# Patient Record
Sex: FEMALE | Race: WHITE | NOT HISPANIC OR LATINO | Employment: FULL TIME | URBAN - METROPOLITAN AREA
[De-identification: names, ages, dates, MRNs, and addresses within clinical notes are randomized per-mention and may not be internally consistent; named-entity substitution may affect disease eponyms.]

---

## 2017-09-22 ENCOUNTER — HOSPITAL ENCOUNTER (OUTPATIENT)
Dept: GENERAL RADIOLOGY | Facility: HOSPITAL | Age: 27
Discharge: HOME | End: 2017-09-22
Admitting: INTERNAL MEDICINE

## 2017-09-22 ENCOUNTER — TRANSCRIBE ORDERS (OUTPATIENT)
Dept: ADMINISTRATIVE | Facility: HOSPITAL | Age: 27
End: 2017-09-22

## 2017-09-22 DIAGNOSIS — M79.672 LEFT FOOT PAIN: ICD-10-CM

## 2017-09-22 DIAGNOSIS — M25.572 LEFT ANKLE PAIN, UNSPECIFIED CHRONICITY: ICD-10-CM

## 2017-09-22 DIAGNOSIS — M79.672 LEFT FOOT PAIN: Primary | ICD-10-CM

## 2017-09-22 PROCEDURE — 73630 X-RAY EXAM OF FOOT: CPT

## 2017-09-22 PROCEDURE — 73610 X-RAY EXAM OF ANKLE: CPT

## 2019-04-11 LAB
EXTERNAL CHLAMYDIA SCREEN: NORMAL
EXTERNAL GONORRHEA SCREEN: NORMAL
EXTERNAL HEPATITIS B SURFACE ANTIGEN: NEGATIVE
EXTERNAL HEPATITIS C AB: NORMAL
EXTERNAL RUBELLA QUALITATIVE: NORMAL
EXTERNAL SYPHILIS RPR SCREEN: NORMAL
EXTERNAL URINE DRUG SCREEN: NORMAL
HIV1 P24 AG SERPL QL IA: NORMAL

## 2019-08-22 ENCOUNTER — HOSPITAL ENCOUNTER (OUTPATIENT)
Facility: HOSPITAL | Age: 29
Setting detail: OBSERVATION
Discharge: HOME | End: 2019-08-23
Attending: OBSTETRICS & GYNECOLOGY | Admitting: OBSTETRICS & GYNECOLOGY

## 2019-08-22 PROBLEM — O16.9 ELEVATED BLOOD PRESSURE AFFECTING PREGNANCY, ANTEPARTUM: Status: ACTIVE | Noted: 2019-08-22

## 2019-08-22 LAB
ALP SERPL-CCNC: 55 U/L (ref 39–117)
ALT SERPL W P-5'-P-CCNC: 12 U/L (ref 1–33)
AST SERPL-CCNC: 11 U/L (ref 1–32)
BASOPHILS # BLD AUTO: 0.02 10*3/MM3 (ref 0–0.2)
BASOPHILS NFR BLD AUTO: 0.3 % (ref 0–1.5)
BILIRUB SERPL-MCNC: <0.2 MG/DL (ref 0.2–1.2)
CREAT BLD-MCNC: 0.61 MG/DL (ref 0.57–1)
DEPRECATED RDW RBC AUTO: 45.2 FL (ref 37–54)
EOSINOPHIL # BLD AUTO: 0.02 10*3/MM3 (ref 0–0.4)
EOSINOPHIL NFR BLD AUTO: 0.3 % (ref 0.3–6.2)
ERYTHROCYTE [DISTWIDTH] IN BLOOD BY AUTOMATED COUNT: 13.6 % (ref 12.3–15.4)
GLUCOSE BLDC GLUCOMTR-MCNC: 101 MG/DL (ref 70–130)
GLUCOSE BLDC GLUCOMTR-MCNC: 79 MG/DL (ref 70–130)
GLUCOSE BLDC GLUCOMTR-MCNC: 98 MG/DL (ref 70–130)
HCT VFR BLD AUTO: 32.4 % (ref 34–46.6)
HGB BLD-MCNC: 10.7 G/DL (ref 12–15.9)
IMM GRANULOCYTES # BLD AUTO: 0.08 10*3/MM3 (ref 0–0.05)
IMM GRANULOCYTES NFR BLD AUTO: 1.1 % (ref 0–0.5)
LDH SERPL-CCNC: 180 U/L (ref 135–214)
LYMPHOCYTES # BLD AUTO: 1.24 10*3/MM3 (ref 0.7–3.1)
LYMPHOCYTES NFR BLD AUTO: 16.9 % (ref 19.6–45.3)
MCH RBC QN AUTO: 30.5 PG (ref 26.6–33)
MCHC RBC AUTO-ENTMCNC: 33 G/DL (ref 31.5–35.7)
MCV RBC AUTO: 92.3 FL (ref 79–97)
MONOCYTES # BLD AUTO: 0.49 10*3/MM3 (ref 0.1–0.9)
MONOCYTES NFR BLD AUTO: 6.7 % (ref 5–12)
NEUTROPHILS # BLD AUTO: 5.47 10*3/MM3 (ref 1.7–7)
NEUTROPHILS NFR BLD AUTO: 74.7 % (ref 42.7–76)
NRBC BLD AUTO-RTO: 0 /100 WBC (ref 0–0.2)
PLATELET # BLD AUTO: 141 10*3/MM3 (ref 140–450)
PMV BLD AUTO: 11.4 FL (ref 6–12)
RBC # BLD AUTO: 3.51 10*6/MM3 (ref 3.77–5.28)
URATE SERPL-MCNC: 4.7 MG/DL (ref 2.4–5.7)
WBC NRBC COR # BLD: 7.32 10*3/MM3 (ref 3.4–10.8)

## 2019-08-22 PROCEDURE — 85025 COMPLETE CBC W/AUTO DIFF WBC: CPT | Performed by: OBSTETRICS & GYNECOLOGY

## 2019-08-22 PROCEDURE — 82962 GLUCOSE BLOOD TEST: CPT

## 2019-08-22 PROCEDURE — 82565 ASSAY OF CREATININE: CPT | Performed by: OBSTETRICS & GYNECOLOGY

## 2019-08-22 PROCEDURE — 96372 THER/PROPH/DIAG INJ SC/IM: CPT

## 2019-08-22 PROCEDURE — 25010000002 BETAMETHASONE ACET & SOD PHOS PER 4 MG: Performed by: OBSTETRICS & GYNECOLOGY

## 2019-08-22 PROCEDURE — 84450 TRANSFERASE (AST) (SGOT): CPT | Performed by: OBSTETRICS & GYNECOLOGY

## 2019-08-22 PROCEDURE — G0379 DIRECT REFER HOSPITAL OBSERV: HCPCS

## 2019-08-22 PROCEDURE — 84460 ALANINE AMINO (ALT) (SGPT): CPT | Performed by: OBSTETRICS & GYNECOLOGY

## 2019-08-22 PROCEDURE — 84550 ASSAY OF BLOOD/URIC ACID: CPT | Performed by: OBSTETRICS & GYNECOLOGY

## 2019-08-22 PROCEDURE — G0378 HOSPITAL OBSERVATION PER HR: HCPCS

## 2019-08-22 PROCEDURE — 82247 BILIRUBIN TOTAL: CPT | Performed by: OBSTETRICS & GYNECOLOGY

## 2019-08-22 PROCEDURE — 59025 FETAL NON-STRESS TEST: CPT

## 2019-08-22 PROCEDURE — 84075 ASSAY ALKALINE PHOSPHATASE: CPT | Performed by: OBSTETRICS & GYNECOLOGY

## 2019-08-22 PROCEDURE — 83615 LACTATE (LD) (LDH) ENZYME: CPT | Performed by: OBSTETRICS & GYNECOLOGY

## 2019-08-22 RX ORDER — BETAMETHASONE SODIUM PHOSPHATE AND BETAMETHASONE ACETATE 3; 3 MG/ML; MG/ML
12 INJECTION, SUSPENSION INTRA-ARTICULAR; INTRALESIONAL; INTRAMUSCULAR; SOFT TISSUE EVERY 24 HOURS
Status: COMPLETED | OUTPATIENT
Start: 2019-08-22 | End: 2019-08-23

## 2019-08-22 RX ORDER — PRENATAL WITH FERROUS FUM AND FOLIC ACID 3080; 920; 120; 400; 22; 1.84; 3; 20; 10; 1; 12; 200; 27; 25; 2 [IU]/1; [IU]/1; MG/1; [IU]/1; MG/1; MG/1; MG/1; MG/1; MG/1; MG/1; UG/1; MG/1; MG/1; MG/1; MG/1
1 TABLET ORAL DAILY
COMMUNITY

## 2019-08-22 RX ADMIN — BETAMETHASONE SODIUM PHOSPHATE AND BETAMETHASONE ACETATE 12 MG: 3; 3 INJECTION, SUSPENSION INTRA-ARTICULAR; INTRALESIONAL; INTRAMUSCULAR at 12:00

## 2019-08-22 NOTE — H&P
"Subjective     Chief Complaint   Patient presents with   • Elevated Blood Pressure       Kalyani Sandhu is a 29 y.o. year old  with an Estimated Date of Delivery: 19 currently at 29w1d presenting with elevated BP.    Prenatal care has been with Dr. renteria.  It has been significant for gestational hypertension.and gestational diabetes.      Kalyani has had normal BPs, until 24 weeks, then had mild range elevations 140s/90s. She had nl labs, no symptoms, was followed in office closely, 24hurine returned protein 330mg. She followed up in office today bp 160/100. So she was sent to l&d for overnight motnitoring, steroids, PDC eval    She was in office [id number] for 3hGTT, which returned abnl, hasnt yet gone to diabetic teaching.     No Additional Complaints Reported    The following portions of the patient's history were reviewed and updated as appropriate:vital signs, allergies, current medications, past medical history, past social history, past surgical history and problem list.    Review of Systems  Pertinent items are noted in HPI.     Objective     /74   Pulse 120   Ht 167.6 cm (66\")   Wt 95.3 kg (210 lb)   BMI 33.89 kg/m²     Physical Exam    General:  well developed; well nourished  no acute distress           Abdomen: soft, non-tender; no masses  no umbilical or inguinal hernias are present  no hepato-splenomegaly  fundus firm and non-tender           Assessment/Plan     ASSESSMENT/ plan  1. IUP at 29w1d  2. GHTN, - inpatient monitoring for severe disease, her 24h urine returned abnl 330 last week, will repeat today, repeat labs, steroids for FLM, PDC US in morning. She is asymptomatic at this point.   3. GDM- will start FSBS while she is in hosptial, and get nutrition consult for diabetic teaching.              Marianela Renteria MD  2019@  "

## 2019-08-22 NOTE — CONSULTS
"Adult Nutrition  Assessment/PES    Patient Name:  Kalyani Sandhu  YOB: 1990  MRN: [id number]  Admit Date:  8/22/2019    Assessment Date:  8/22/2019    Comments:  Anticipate good effort w diet.    Reason for Assessment     Row Name 08/22/19 1657          Reason for Assessment    Reason For Assessment  physician consult education 30 min     Diagnosis  other (see comments) Dx w gestational DM           Anthropometrics     Row Name 08/22/19 1058          Anthropometrics    Height  167.6 cm (66\")     Weight  95.3 kg (210 lb)                   Problem/Interventions:  Problem 1     Row Name 08/22/19 1658          Nutrition Diagnoses Problem 1    Problem 1  Knowledge Deficit     Etiology (related to)  Medical Diagnosis     Endocrine  GDM     Signs/Symptoms (evidenced by)  Reported  Information Deficit     Resolved?  Yes                 Intervention Goal     Row Name 08/22/19 1658          Intervention Goal    General  Provide information regarding MNT for treatment/condition             Education/Evaluation     Row Name 08/22/19 1650          Education    Education  Provided education regarding;Education topics;Advised regarding habits/behavior     Provided education regarding  Diet rationale;Key food habit change     Education Topics  CHO counting;Gestational DM     Advised Regarding Habits/Behavior  Eating pattern;Food choices;Appropriate portions        Monitor/Evaluation    Monitor  Per protocol     Education Follow-up  -- excellent attn to instruction and materials anticiptate good effort.           Electronically signed by:  Jyotsna Oseguera RD  08/22/19 5:01 PM  "

## 2019-08-23 ENCOUNTER — APPOINTMENT (OUTPATIENT)
Dept: WOMENS IMAGING | Facility: HOSPITAL | Age: 29
End: 2019-08-23

## 2019-08-23 VITALS
HEART RATE: 104 BPM | SYSTOLIC BLOOD PRESSURE: 136 MMHG | DIASTOLIC BLOOD PRESSURE: 72 MMHG | HEIGHT: 66 IN | TEMPERATURE: 97.9 F | WEIGHT: 210 LBS | BODY MASS INDEX: 33.75 KG/M2 | RESPIRATION RATE: 16 BRPM

## 2019-08-23 PROBLEM — O16.9 ELEVATED BLOOD PRESSURE COMPLICATING PREGNANCY, ANTEPARTUM: Status: ACTIVE | Noted: 2019-08-23

## 2019-08-23 LAB
COLLECT DURATION TIME UR: 24 HRS
GLUCOSE BLDC GLUCOMTR-MCNC: 104 MG/DL (ref 70–130)
GLUCOSE BLDC GLUCOMTR-MCNC: 121 MG/DL (ref 70–130)
GLUCOSE BLDC GLUCOMTR-MCNC: 90 MG/DL (ref 70–130)
GLUCOSE BLDC GLUCOMTR-MCNC: 95 MG/DL (ref 70–130)
PROT 24H UR-MRATE: 196 MG/24HOURS (ref 0–150)
PROT UR-MCNC: 5.6 MG/DL
SPECIMEN VOL 24H UR: 3500 ML

## 2019-08-23 PROCEDURE — 81050 URINALYSIS VOLUME MEASURE: CPT | Performed by: OBSTETRICS & GYNECOLOGY

## 2019-08-23 PROCEDURE — 25010000002 BETAMETHASONE ACET & SOD PHOS PER 4 MG: Performed by: OBSTETRICS & GYNECOLOGY

## 2019-08-23 PROCEDURE — 76811 OB US DETAILED SNGL FETUS: CPT

## 2019-08-23 PROCEDURE — 84156 ASSAY OF PROTEIN URINE: CPT | Performed by: OBSTETRICS & GYNECOLOGY

## 2019-08-23 PROCEDURE — G0108 DIAB MANAGE TRN  PER INDIV: HCPCS

## 2019-08-23 PROCEDURE — 59025 FETAL NON-STRESS TEST: CPT

## 2019-08-23 PROCEDURE — 82962 GLUCOSE BLOOD TEST: CPT

## 2019-08-23 PROCEDURE — G0378 HOSPITAL OBSERVATION PER HR: HCPCS

## 2019-08-23 PROCEDURE — 76811 OB US DETAILED SNGL FETUS: CPT | Performed by: OBSTETRICS & GYNECOLOGY

## 2019-08-23 PROCEDURE — 96372 THER/PROPH/DIAG INJ SC/IM: CPT

## 2019-08-23 RX ADMIN — BETAMETHASONE SODIUM PHOSPHATE AND BETAMETHASONE ACETATE 12 MG: 3; 3 INJECTION, SUSPENSION INTRA-ARTICULAR; INTRALESIONAL; INTRAMUSCULAR at 12:07

## 2019-08-23 NOTE — CONSULTS
"Diabetes Education  Assessment/Teaching    Patient Name:  Kalyani Sandhu  YOB: 1990  MRN: [id number]  Admit Date:  8/22/2019      Assessment Date:  8/23/2019    Most Recent Value   General Information    Referral From:  MD dhillon   Height  167.6 cm (66\")   Height Method  Stated   Weight  95.3 kg (210 lb)   Weight Method  Stated   How would you rate your current health?  good   Is patient pregnant?  yes   Pregnancy Assessment   When is your due date?  11/06/19   Do you plan to breast feed or bottle feed?  breast feed   Are you taking prenatal vitamins?  yes   Do you have indigestion or other food related problem?  no   Diabetes History   What type of diabetes do you have?  Gestational   Length of Diabetes Diagnosis  Newly diagnosed <6 months   Current DM knowledge  fair   Have you had diabetes education/teaching in the past?  no   Do you test your blood sugar at home?  no   Education Preferences   What areas of diabetes would you like to learn about?  avoiding high blood sugar, pregnancy and diabetes, understanding diabetes, testing my blood sugar at home   Nutrition Information   Are you currently following a special meal plan?  never   Assessment Topics   Healthy Eating - Assessment  Needs education   Being Active - Assessment  Needs education   Taking Medication - Assessment  Needs education   Problem Solving - Assessment  Needs education   Reducing Risk - Assessment  Needs education   Healthy Coping - Assessment  Needs education   Monitoring - Assessment  Needs education   DM Goals            Most Recent Value   DM Education Needs   Meter  Meter provided   Meter Type  One Touch   Frequency of Testing  4 times a day   Blood Glucose Target Range  per ADA recommendations   Problem Solving  Hypoglycemia, Hyperglycemia, Sick days, Signs, Symptoms, Treatment   Reducing Risks  Blood pressure, A1C testing   Healthy Eating  RD consult   Physical Activity  Walking   Physical Activity Frequency  " Occasionally, Discussed exercise importance   Healthy Coping  Appropriate   Gestational  Diagnosis, Risk factors, Treatment goals, Maternal complications, Fetal complications, Postpartum follow up, Type 2 DM risk reduction   Discharge Plan  Home, Follow-up with MD   Motivation  Engaged   Teaching Method  Explanation, Discussion, Demonstration, Handouts, Teach back   Patient Response  Verbalized understanding, Needs reinforcement, Demonstrates adequately            Other Comments:  Saw Kalyani at bedside for education on gestational diabetes. Her  was at bedside during education. Discussed and taught Kalyani and her  about gestational diabetes, gestational diabetes causes, and risk factors to mother and baby.  Education provided on the role of insulin resistance and hormone effects on blood sugar during pregnancy. Education also provided on higher risk of developing Type 2 diabetes. Discussed and gave tips for controlling blood sugar with healthy eating by following a suggested meal plan, regular activity/exercise if approved by MD, taking medications if ordered, and stress reduction. Provided Kalyani with a donated OneTouch Verio Flex meter. Taught her how to use meter and lancing device, and care of meter and strips. Kalyani has some anxiety regarding sticking her own finger at home. Suggested she start being more involved in her fingersticks here at the hospital-she was able to return demonstration with the provided meter, and was also able to demonstrate proper technique with obtaining drop of blood and placing it in strip when staff came to check her blood sugar. Instructed her to check blood sugar 4 times per day (fasting and 2 hours post prandial) once discharged home, and to keep a blood sugar log. Discussed w/ her that she will need to obtain prescription from her doctor for strips and lancets for the provided meter. Discussed the benefits of glucose control to reduce the risk of  complications. Target blood sugar goals per ADA recommendations for GDM given to patient, and instructed to follow her doctor's recommendations regarding both target goals and frequency of testing. Kalyani was provided with [facility]'s Gestational Diabetes book, which includes additional information and resources. Discussed outpatient education and Kalyani is very interested, would like to come to an outpatient class and meet with both the RD and RN. She requests an appointment for next Thursday b/c her  is off work and could attend with her. Scheduled appointment for Thursday 8/29 at 9am, notified patient; also made follow up phone call to pt's nurse Ana to notify that referral will be needed from Dr. Renteria for outpatient education. Kalyani was provided with my contact information for any questions or concerns. Thank you for the consult!    Electronically signed by:  Ange Giordano RN  08/23/19 2:16 PM

## 2019-08-23 NOTE — PROGRESS NOTES
Pt without headache or vision changes.    BP stable ; improved from admission.      24 hr urine; < 300 mg;     Discussed plan with Dr Renteria.     OK to discharge home to outpt management with Home BP monitoring and twice weekly fetal testing in office. Will keep off BP meds for now.

## 2019-08-23 NOTE — PROGRESS NOTES
2019  HD:1  29 y.o. yo female  at 29w2d    Subjective   Kalyani has no complaints. Denies headache, visual changes, RUQ pain. Good FM.    PDC US this am, nl growth, fluid.       Bps since admission have been nl-mild range. Labs nl. 24h urine pending       Objective   Temp: Temp:  [97.6 °F (36.4 °C)-99.2 °F (37.3 °C)] 97.6 °F (36.4 °C) Temp src: Oral   BP: BP: (121-143)/(73-87) 137/75        Pulse: Heart Rate:  [] 100  RR: Resp:  [15-16] 15    General:  nad   Abdomen: Gravid, nontender         Lab Results   Component Value Date    WBC 7.32 2019    HGB 10.7 (L) 2019    HCT 32.4 (L) 2019    MCV 92.3 2019     2019    HEPBSAG Negative 2019     Results from last 7 days   Lab Units 19  1130   AST (SGOT) U/L 11     Results from last 7 days   Lab Units 19  1130   ALT (SGPT) U/L 12      Results from last 7 days   Lab Units 19  1130   CREATININE mg/dL 0.61      Results from last 7 days   Lab Units 19  1130   BILIRUBIN mg/dL <0.2*         Assessment  1.   29 y.o. yo female  at 29w2d  2. GHTN- no evidence of severe disease  3. New diagnosis GDM- fsbs so far have been ok    Plan  D/w dr milligan. Will wait for 24h urine result this afternoon, as long as stable, ok for dc home with close office FU and decreased activity.    This note has been electronically signed.    Marianela Renteria MD  2019

## 2019-08-23 NOTE — PLAN OF CARE
Problem: Patient Care Overview  Goal: Plan of Care Review  Outcome: Ongoing (interventions implemented as appropriate)   08/23/19 1613   Coping/Psychosocial   Plan of Care Reviewed With patient   Plan of Care Review   Progress improving       Problem: Hypertensive Disorders in Pregnancy (Adult,Obstetrics,Pediatric)  Goal: Signs and Symptoms of Listed Potential Problems Will be Absent, Minimized or Managed (Hypertensive Disorders in Pregnancy)  Outcome: Ongoing (interventions implemented as appropriate)   08/23/19 1613   Goal/Outcome Evaluation   Problems Assessed (Hypertensive Disorders in Pregnancy) all   Problems Present (Hypertensive/in PG) none

## 2019-08-29 ENCOUNTER — HOSPITAL ENCOUNTER (OUTPATIENT)
Dept: DIABETES SERVICES | Facility: HOSPITAL | Age: 29
Setting detail: RECURRING SERIES
Discharge: HOME | End: 2019-08-29

## 2019-08-29 ENCOUNTER — TRANSCRIBE ORDERS (OUTPATIENT)
Dept: DIABETES SERVICES | Facility: HOSPITAL | Age: 29
End: 2019-08-29

## 2019-08-29 DIAGNOSIS — O24.410 DIET CONTROLLED GESTATIONAL DIABETES MELLITUS (GDM), ANTEPARTUM: Primary | ICD-10-CM

## 2019-08-29 PROCEDURE — G0109 DIAB MANAGE TRN IND/GROUP: HCPCS

## 2019-09-06 ENCOUNTER — APPOINTMENT (OUTPATIENT)
Dept: DIABETES SERVICES | Facility: HOSPITAL | Age: 29
End: 2019-09-06

## 2019-10-11 ENCOUNTER — LAB (OUTPATIENT)
Dept: LAB | Facility: HOSPITAL | Age: 29
End: 2019-10-11

## 2019-10-11 ENCOUNTER — TRANSCRIBE ORDERS (OUTPATIENT)
Dept: LAB | Facility: HOSPITAL | Age: 29
End: 2019-10-11

## 2019-10-11 DIAGNOSIS — Z34.03 ENCOUNTER FOR SUPERVISION OF NORMAL FIRST PREGNANCY IN THIRD TRIMESTER: ICD-10-CM

## 2019-10-11 DIAGNOSIS — Z34.03 ENCOUNTER FOR SUPERVISION OF NORMAL FIRST PREGNANCY IN THIRD TRIMESTER: Primary | ICD-10-CM

## 2019-10-11 PROCEDURE — 87081 CULTURE SCREEN ONLY: CPT

## 2019-10-14 LAB — BACTERIA SPEC AEROBE CULT: NORMAL

## 2019-10-21 ENCOUNTER — APPOINTMENT (OUTPATIENT)
Dept: PREADMISSION TESTING | Facility: HOSPITAL | Age: 29
End: 2019-10-21

## 2019-10-21 ENCOUNTER — PREP FOR SURGERY (OUTPATIENT)
Dept: OTHER | Facility: HOSPITAL | Age: 29
End: 2019-10-21

## 2019-10-21 VITALS — WEIGHT: 211.38 LBS | BODY MASS INDEX: 33.18 KG/M2 | HEIGHT: 67 IN

## 2019-10-21 DIAGNOSIS — O98.419 ACUTE VIRAL HEPATITIS AFFECTING PREGNANCY: ICD-10-CM

## 2019-10-21 DIAGNOSIS — Z34.90 PREGNANCY, UNSPECIFIED GESTATIONAL AGE: Primary | ICD-10-CM

## 2019-10-21 LAB
ABO GROUP BLD: NORMAL
ALP SERPL-CCNC: 97 U/L (ref 39–117)
ALT SERPL W P-5'-P-CCNC: 17 U/L (ref 1–33)
AST SERPL-CCNC: 16 U/L (ref 1–32)
BILIRUB SERPL-MCNC: 0.3 MG/DL (ref 0.2–1.2)
BLD GP AB SCN SERPL QL: NEGATIVE
CREAT BLD-MCNC: 0.71 MG/DL (ref 0.57–1)
DEPRECATED RDW RBC AUTO: 47.9 FL (ref 37–54)
ERYTHROCYTE [DISTWIDTH] IN BLOOD BY AUTOMATED COUNT: 14 % (ref 12.3–15.4)
HCT VFR BLD AUTO: 36 % (ref 34–46.6)
HGB BLD-MCNC: 12 G/DL (ref 12–15.9)
LDH SERPL-CCNC: 215 U/L (ref 135–214)
MCH RBC QN AUTO: 31.3 PG (ref 26.6–33)
MCHC RBC AUTO-ENTMCNC: 33.3 G/DL (ref 31.5–35.7)
MCV RBC AUTO: 93.8 FL (ref 79–97)
PLATELET # BLD AUTO: 125 10*3/MM3 (ref 140–450)
PMV BLD AUTO: 12 FL (ref 6–12)
RBC # BLD AUTO: 3.84 10*6/MM3 (ref 3.77–5.28)
RH BLD: POSITIVE
T&S EXPIRATION DATE: NORMAL
URATE SERPL-MCNC: 5.3 MG/DL (ref 2.4–5.7)
WBC NRBC COR # BLD: 6.55 10*3/MM3 (ref 3.4–10.8)

## 2019-10-21 PROCEDURE — 84450 TRANSFERASE (AST) (SGOT): CPT | Performed by: NURSE PRACTITIONER

## 2019-10-21 PROCEDURE — 86901 BLOOD TYPING SEROLOGIC RH(D): CPT | Performed by: NURSE PRACTITIONER

## 2019-10-21 PROCEDURE — 83615 LACTATE (LD) (LDH) ENZYME: CPT | Performed by: NURSE PRACTITIONER

## 2019-10-21 PROCEDURE — 84075 ASSAY ALKALINE PHOSPHATASE: CPT | Performed by: NURSE PRACTITIONER

## 2019-10-21 PROCEDURE — 84460 ALANINE AMINO (ALT) (SGPT): CPT | Performed by: NURSE PRACTITIONER

## 2019-10-21 PROCEDURE — 85027 COMPLETE CBC AUTOMATED: CPT | Performed by: NURSE PRACTITIONER

## 2019-10-21 PROCEDURE — 84550 ASSAY OF BLOOD/URIC ACID: CPT | Performed by: NURSE PRACTITIONER

## 2019-10-21 PROCEDURE — 82565 ASSAY OF CREATININE: CPT | Performed by: NURSE PRACTITIONER

## 2019-10-21 PROCEDURE — 86850 RBC ANTIBODY SCREEN: CPT | Performed by: NURSE PRACTITIONER

## 2019-10-21 PROCEDURE — 86900 BLOOD TYPING SEROLOGIC ABO: CPT | Performed by: NURSE PRACTITIONER

## 2019-10-21 PROCEDURE — 82247 BILIRUBIN TOTAL: CPT | Performed by: NURSE PRACTITIONER

## 2019-10-21 RX ORDER — SODIUM CHLORIDE 0.9 % (FLUSH) 0.9 %
10 SYRINGE (ML) INJECTION AS NEEDED
Status: CANCELLED | OUTPATIENT
Start: 2019-10-21

## 2019-10-21 RX ORDER — OXYTOCIN-SODIUM CHLORIDE 0.9% IV SOLN 30 UNIT/500ML 30-0.9/5 UT/ML-%
85 SOLUTION INTRAVENOUS ONCE
Status: CANCELLED | OUTPATIENT
Start: 2019-10-21 | End: 2019-10-21

## 2019-10-21 RX ORDER — OXYTOCIN-SODIUM CHLORIDE 0.9% IV SOLN 30 UNIT/500ML 30-0.9/5 UT/ML-%
650 SOLUTION INTRAVENOUS ONCE
Status: CANCELLED | OUTPATIENT
Start: 2019-10-21 | End: 2019-10-21

## 2019-10-21 RX ORDER — TRISODIUM CITRATE DIHYDRATE AND CITRIC ACID MONOHYDRATE 500; 334 MG/5ML; MG/5ML
30 SOLUTION ORAL ONCE
Status: CANCELLED | OUTPATIENT
Start: 2019-10-21 | End: 2019-10-21

## 2019-10-21 RX ORDER — SODIUM CHLORIDE 0.9 % (FLUSH) 0.9 %
3 SYRINGE (ML) INJECTION EVERY 12 HOURS SCHEDULED
Status: CANCELLED | OUTPATIENT
Start: 2019-10-21

## 2019-10-21 RX ORDER — CEFAZOLIN SODIUM 2 G/100ML
2 INJECTION, SOLUTION INTRAVENOUS ONCE
Status: CANCELLED | OUTPATIENT
Start: 2019-10-21 | End: 2019-10-21

## 2019-10-21 RX ORDER — LIDOCAINE HYDROCHLORIDE 10 MG/ML
5 INJECTION, SOLUTION EPIDURAL; INFILTRATION; INTRACAUDAL; PERINEURAL AS NEEDED
Status: CANCELLED | OUTPATIENT
Start: 2019-10-21

## 2019-10-21 RX ORDER — SODIUM CHLORIDE, SODIUM LACTATE, POTASSIUM CHLORIDE, CALCIUM CHLORIDE 600; 310; 30; 20 MG/100ML; MG/100ML; MG/100ML; MG/100ML
125 INJECTION, SOLUTION INTRAVENOUS CONTINUOUS
Status: CANCELLED | OUTPATIENT
Start: 2019-10-21

## 2019-10-21 NOTE — DISCHARGE INSTRUCTIONS
What to know before your arrive:     -Do not eat, drink or chew gum after midnight the day before your procedure.    This also includes mints.   -Do not shave any part of your body including abdomen or pelvic are for two    days before your procedure.   -If you are taking a scheduled medication (insulin, blood pressure medicine,   antibiotics) please consult with your physician whether to take on the day of   surgery.   -Remove all jewelry including rings, wedding bands, and piercing before coming   to the hospital.   -Leave important valuables at home.   -Do not wear dark fingernail polish.   -Bring the following with you to the hospital:    -Picture ID and insurance, Medicare or Medicaid cards    -Co-pay/deductible required by insurance (Cash, Check, Credit Card)    -Copy of living will or power  document (if applicable)    -CPAP mask and tubing, not machine (if applicable)    -Skin prep instructions sheet    What to know the day of procedure:     -Park in the [facility], take elevator for first floor, exit to the right and  proceed through the doors to outside, follow the covered sidewalk to the  entrance of the [facility], follow the hallway and signs to the [facility],  enter the [facility] to your right BEFORE entering the 17[address] lobby.  Take the  elevators to the 3rd floor ([address] [facility]).   -Leave unnecessary items in your vehicle, including your suitcase.  Your support  person or a family member can get it for you after your procedure.   -Check in at the reception desk in the lobby of the 3rd floor ([address] [facility]).   -One person may accompany you to the pre-op/recovery area.  Please have  other family members wait in the waiting room.   -An anesthesiologist will meet with your prior to your procedure.   -After anesthesia has been initiated, one person may accompany you in the  operating room.   -No video cameras are permitted in the operating room; only still cameras,  Please.      What to  expect while you are in recovery:     -One person may stay with you while you are in recovery.   -If the baby is stable, he/she may visit to initiate breastfeeding & [facility].    THE FOLLOWING INFORMATION WAS PROVIDED TO PATIENT:     SECTION BOOKLET BY PRIYANKA  PAIN MANAGEMENT   RESPIREX    CHLORHEXIDINE GLUCONATE WIPES AND INSTRUCTIONS GIVEN TO PATIENT

## 2019-10-22 ENCOUNTER — ANESTHESIA EVENT (OUTPATIENT)
Dept: LABOR AND DELIVERY | Facility: HOSPITAL | Age: 29
End: 2019-10-22

## 2019-10-22 ENCOUNTER — ANESTHESIA (OUTPATIENT)
Dept: LABOR AND DELIVERY | Facility: HOSPITAL | Age: 29
End: 2019-10-22

## 2019-10-22 ENCOUNTER — HOSPITAL ENCOUNTER (INPATIENT)
Facility: HOSPITAL | Age: 29
LOS: 4 days | Discharge: HOME | End: 2019-10-26
Attending: OBSTETRICS & GYNECOLOGY | Admitting: OBSTETRICS & GYNECOLOGY

## 2019-10-22 DIAGNOSIS — Z34.90 PREGNANCY, UNSPECIFIED GESTATIONAL AGE: ICD-10-CM

## 2019-10-22 PROBLEM — O13.9 GESTATIONAL HYPERTENSION: Status: ACTIVE | Noted: 2019-10-22

## 2019-10-22 LAB
ABO GROUP BLD: NORMAL
GLUCOSE BLDC GLUCOMTR-MCNC: 86 MG/DL (ref 70–130)
RH BLD: POSITIVE

## 2019-10-22 PROCEDURE — 25010000002 ONDANSETRON PER 1 MG: Performed by: NURSE ANESTHETIST, CERTIFIED REGISTERED

## 2019-10-22 PROCEDURE — 25010000002 FENTANYL CITRATE (PF) 100 MCG/2ML SOLUTION: Performed by: NURSE ANESTHETIST, CERTIFIED REGISTERED

## 2019-10-22 PROCEDURE — 86901 BLOOD TYPING SEROLOGIC RH(D): CPT

## 2019-10-22 PROCEDURE — 25010000003 CEFAZOLIN IN DEXTROSE 2-4 GM/100ML-% SOLUTION: Performed by: NURSE PRACTITIONER

## 2019-10-22 PROCEDURE — 63710000001 PROMETHAZINE PER 25 MG: Performed by: OBSTETRICS & GYNECOLOGY

## 2019-10-22 PROCEDURE — 82962 GLUCOSE BLOOD TEST: CPT

## 2019-10-22 PROCEDURE — 25010000002 METOCLOPRAMIDE PER 10 MG: Performed by: NURSE ANESTHETIST, CERTIFIED REGISTERED

## 2019-10-22 PROCEDURE — 59025 FETAL NON-STRESS TEST: CPT

## 2019-10-22 PROCEDURE — 25010000003 MORPHINE PER 10 MG: Performed by: NURSE ANESTHETIST, CERTIFIED REGISTERED

## 2019-10-22 PROCEDURE — 86900 BLOOD TYPING SEROLOGIC ABO: CPT

## 2019-10-22 PROCEDURE — 51703 INSERT BLADDER CATH COMPLEX: CPT

## 2019-10-22 RX ORDER — OXYTOCIN 10 [USP'U]/ML
INJECTION, SOLUTION INTRAMUSCULAR; INTRAVENOUS AS NEEDED
Status: DISCONTINUED | OUTPATIENT
Start: 2019-10-22 | End: 2019-10-22 | Stop reason: SURG

## 2019-10-22 RX ORDER — BUPIVACAINE HYDROCHLORIDE 7.5 MG/ML
INJECTION, SOLUTION EPIDURAL; RETROBULBAR AS NEEDED
Status: DISCONTINUED | OUTPATIENT
Start: 2019-10-22 | End: 2019-10-22 | Stop reason: SURG

## 2019-10-22 RX ORDER — SODIUM CHLORIDE 0.9 % (FLUSH) 0.9 %
10 SYRINGE (ML) INJECTION AS NEEDED
Status: DISCONTINUED | OUTPATIENT
Start: 2019-10-22 | End: 2019-10-22 | Stop reason: HOSPADM

## 2019-10-22 RX ORDER — CARBOPROST TROMETHAMINE 250 UG/ML
250 INJECTION, SOLUTION INTRAMUSCULAR ONCE
Status: DISCONTINUED | OUTPATIENT
Start: 2019-10-22 | End: 2019-10-26 | Stop reason: HOSPADM

## 2019-10-22 RX ORDER — SIMETHICONE 80 MG
80 TABLET,CHEWABLE ORAL 4 TIMES DAILY PRN
Status: DISCONTINUED | OUTPATIENT
Start: 2019-10-22 | End: 2019-10-26 | Stop reason: HOSPADM

## 2019-10-22 RX ORDER — CEFAZOLIN SODIUM 2 G/100ML
2 INJECTION, SOLUTION INTRAVENOUS ONCE
Status: COMPLETED | OUTPATIENT
Start: 2019-10-22 | End: 2019-10-22

## 2019-10-22 RX ORDER — MORPHINE SULFATE 2 MG/ML
2 INJECTION, SOLUTION INTRAMUSCULAR; INTRAVENOUS EVERY 4 HOURS PRN
Status: DISCONTINUED | OUTPATIENT
Start: 2019-10-22 | End: 2019-10-26 | Stop reason: HOSPADM

## 2019-10-22 RX ORDER — FENTANYL CITRATE 50 UG/ML
INJECTION, SOLUTION INTRAMUSCULAR; INTRAVENOUS AS NEEDED
Status: DISCONTINUED | OUTPATIENT
Start: 2019-10-22 | End: 2019-10-22 | Stop reason: SURG

## 2019-10-22 RX ORDER — OXYCODONE HYDROCHLORIDE AND ACETAMINOPHEN 5; 325 MG/1; MG/1
1 TABLET ORAL EVERY 4 HOURS PRN
Status: DISCONTINUED | OUTPATIENT
Start: 2019-10-22 | End: 2019-10-26 | Stop reason: HOSPADM

## 2019-10-22 RX ORDER — SODIUM CHLORIDE, SODIUM LACTATE, POTASSIUM CHLORIDE, CALCIUM CHLORIDE 600; 310; 30; 20 MG/100ML; MG/100ML; MG/100ML; MG/100ML
125 INJECTION, SOLUTION INTRAVENOUS CONTINUOUS
Status: DISCONTINUED | OUTPATIENT
Start: 2019-10-22 | End: 2019-10-22 | Stop reason: HOSPADM

## 2019-10-22 RX ORDER — ONDANSETRON 2 MG/ML
INJECTION INTRAMUSCULAR; INTRAVENOUS AS NEEDED
Status: DISCONTINUED | OUTPATIENT
Start: 2019-10-22 | End: 2019-10-22 | Stop reason: SURG

## 2019-10-22 RX ORDER — CALCIUM CARBONATE 200(500)MG
1 TABLET,CHEWABLE ORAL DAILY
COMMUNITY

## 2019-10-22 RX ORDER — FAMOTIDINE 10 MG/ML
INJECTION, SOLUTION INTRAVENOUS AS NEEDED
Status: DISCONTINUED | OUTPATIENT
Start: 2019-10-22 | End: 2019-10-22 | Stop reason: SURG

## 2019-10-22 RX ORDER — OXYTOCIN-SODIUM CHLORIDE 0.9% IV SOLN 30 UNIT/500ML 30-0.9/5 UT/ML-%
85 SOLUTION INTRAVENOUS ONCE
Status: COMPLETED | OUTPATIENT
Start: 2019-10-22 | End: 2019-10-22

## 2019-10-22 RX ORDER — NALOXONE HCL 0.4 MG/ML
0.4 VIAL (ML) INJECTION
Status: ACTIVE | OUTPATIENT
Start: 2019-10-22 | End: 2019-10-23

## 2019-10-22 RX ORDER — TRISODIUM CITRATE DIHYDRATE AND CITRIC ACID MONOHYDRATE 500; 334 MG/5ML; MG/5ML
30 SOLUTION ORAL ONCE
Status: COMPLETED | OUTPATIENT
Start: 2019-10-22 | End: 2019-10-22

## 2019-10-22 RX ORDER — METHYLERGONOVINE MALEATE 0.2 MG/ML
200 INJECTION INTRAVENOUS ONCE
Status: DISCONTINUED | OUTPATIENT
Start: 2019-10-22 | End: 2019-10-26 | Stop reason: HOSPADM

## 2019-10-22 RX ORDER — MORPHINE SULFATE 0.5 MG/ML
INJECTION, SOLUTION EPIDURAL; INTRATHECAL; INTRAVENOUS AS NEEDED
Status: DISCONTINUED | OUTPATIENT
Start: 2019-10-22 | End: 2019-10-22 | Stop reason: SURG

## 2019-10-22 RX ORDER — DOCUSATE SODIUM 100 MG/1
100 CAPSULE, LIQUID FILLED ORAL 2 TIMES DAILY PRN
Status: DISCONTINUED | OUTPATIENT
Start: 2019-10-22 | End: 2019-10-26 | Stop reason: HOSPADM

## 2019-10-22 RX ORDER — OXYCODONE AND ACETAMINOPHEN 7.5; 325 MG/1; MG/1
1 TABLET ORAL ONCE AS NEEDED
Status: COMPLETED | OUTPATIENT
Start: 2019-10-22 | End: 2019-10-22

## 2019-10-22 RX ORDER — PROMETHAZINE HYDROCHLORIDE 25 MG/ML
12.5 INJECTION, SOLUTION INTRAMUSCULAR; INTRAVENOUS EVERY 6 HOURS PRN
Status: DISCONTINUED | OUTPATIENT
Start: 2019-10-22 | End: 2019-10-26 | Stop reason: HOSPADM

## 2019-10-22 RX ORDER — SODIUM CHLORIDE 0.9 % (FLUSH) 0.9 %
3 SYRINGE (ML) INJECTION EVERY 12 HOURS SCHEDULED
Status: DISCONTINUED | OUTPATIENT
Start: 2019-10-22 | End: 2019-10-22 | Stop reason: HOSPADM

## 2019-10-22 RX ORDER — ONDANSETRON 2 MG/ML
4 INJECTION INTRAMUSCULAR; INTRAVENOUS ONCE
Status: DISCONTINUED | OUTPATIENT
Start: 2019-10-22 | End: 2019-10-22 | Stop reason: HOSPADM

## 2019-10-22 RX ORDER — PROMETHAZINE HYDROCHLORIDE 25 MG/1
25 TABLET ORAL EVERY 6 HOURS PRN
Status: DISCONTINUED | OUTPATIENT
Start: 2019-10-22 | End: 2019-10-26 | Stop reason: HOSPADM

## 2019-10-22 RX ORDER — DIPHENHYDRAMINE HCL 25 MG
25 CAPSULE ORAL EVERY 4 HOURS PRN
Status: DISCONTINUED | OUTPATIENT
Start: 2019-10-22 | End: 2019-10-26 | Stop reason: HOSPADM

## 2019-10-22 RX ORDER — IBUPROFEN 600 MG/1
600 TABLET ORAL EVERY 6 HOURS PRN
Status: DISCONTINUED | OUTPATIENT
Start: 2019-10-22 | End: 2019-10-26 | Stop reason: HOSPADM

## 2019-10-22 RX ORDER — DIPHENHYDRAMINE HYDROCHLORIDE 50 MG/ML
25 INJECTION INTRAMUSCULAR; INTRAVENOUS EVERY 4 HOURS PRN
Status: DISCONTINUED | OUTPATIENT
Start: 2019-10-22 | End: 2019-10-26 | Stop reason: HOSPADM

## 2019-10-22 RX ORDER — NALOXONE HCL 0.4 MG/ML
0.4 VIAL (ML) INJECTION
Status: DISCONTINUED | OUTPATIENT
Start: 2019-10-22 | End: 2019-10-26 | Stop reason: HOSPADM

## 2019-10-22 RX ORDER — OXYCODONE AND ACETAMINOPHEN 10; 325 MG/1; MG/1
1 TABLET ORAL EVERY 4 HOURS PRN
Status: DISCONTINUED | OUTPATIENT
Start: 2019-10-22 | End: 2019-10-26 | Stop reason: HOSPADM

## 2019-10-22 RX ORDER — OXYTOCIN-SODIUM CHLORIDE 0.9% IV SOLN 30 UNIT/500ML 30-0.9/5 UT/ML-%
650 SOLUTION INTRAVENOUS ONCE
Status: COMPLETED | OUTPATIENT
Start: 2019-10-22 | End: 2019-10-22

## 2019-10-22 RX ORDER — METOCLOPRAMIDE HYDROCHLORIDE 5 MG/ML
INJECTION INTRAMUSCULAR; INTRAVENOUS AS NEEDED
Status: DISCONTINUED | OUTPATIENT
Start: 2019-10-22 | End: 2019-10-22 | Stop reason: SURG

## 2019-10-22 RX ORDER — LIDOCAINE HYDROCHLORIDE 10 MG/ML
5 INJECTION, SOLUTION EPIDURAL; INFILTRATION; INTRACAUDAL; PERINEURAL AS NEEDED
Status: DISCONTINUED | OUTPATIENT
Start: 2019-10-22 | End: 2019-10-22 | Stop reason: HOSPADM

## 2019-10-22 RX ORDER — HYDROMORPHONE HYDROCHLORIDE 1 MG/ML
0.5 INJECTION, SOLUTION INTRAMUSCULAR; INTRAVENOUS; SUBCUTANEOUS
Status: DISCONTINUED | OUTPATIENT
Start: 2019-10-22 | End: 2019-10-22 | Stop reason: HOSPADM

## 2019-10-22 RX ORDER — MISOPROSTOL 200 UG/1
800 TABLET ORAL AS NEEDED
Status: DISCONTINUED | OUTPATIENT
Start: 2019-10-22 | End: 2019-10-26 | Stop reason: HOSPADM

## 2019-10-22 RX ORDER — PROMETHAZINE HYDROCHLORIDE 12.5 MG/1
12.5 SUPPOSITORY RECTAL EVERY 6 HOURS PRN
Status: DISCONTINUED | OUTPATIENT
Start: 2019-10-22 | End: 2019-10-26 | Stop reason: HOSPADM

## 2019-10-22 RX ADMIN — IBUPROFEN 600 MG: 600 TABLET ORAL at 17:49

## 2019-10-22 RX ADMIN — IBUPROFEN 600 MG: 600 TABLET ORAL at 12:42

## 2019-10-22 RX ADMIN — IBUPROFEN 600 MG: 600 TABLET ORAL at 23:49

## 2019-10-22 RX ADMIN — SODIUM CITRATE AND CITRIC ACID MONOHYDRATE 30 ML: 500; 334 SOLUTION ORAL at 09:12

## 2019-10-22 RX ADMIN — FAMOTIDINE 20 MG: 10 INJECTION, SOLUTION INTRAVENOUS at 09:17

## 2019-10-22 RX ADMIN — OXYTOCIN 30 UNITS: 10 INJECTION, SOLUTION INTRAMUSCULAR; INTRAVENOUS at 09:40

## 2019-10-22 RX ADMIN — DOCUSATE SODIUM 100 MG: 100 CAPSULE, LIQUID FILLED ORAL at 20:20

## 2019-10-22 RX ADMIN — FENTANYL CITRATE 15 MCG: 50 INJECTION, SOLUTION INTRAMUSCULAR; INTRAVENOUS at 09:23

## 2019-10-22 RX ADMIN — BUPIVACAINE HYDROCHLORIDE 1.6 ML: 7.5 INJECTION, SOLUTION EPIDURAL; RETROBULBAR at 09:23

## 2019-10-22 RX ADMIN — SIMETHICONE CHEW TAB 80 MG 80 MG: 80 TABLET ORAL at 12:41

## 2019-10-22 RX ADMIN — OXYTOCIN 650 ML/HR: 10 INJECTION, SOLUTION INTRAMUSCULAR; INTRAVENOUS at 10:26

## 2019-10-22 RX ADMIN — MORPHINE SULFATE 150 MCG: 0.5 INJECTION, SOLUTION EPIDURAL; INTRATHECAL; INTRAVENOUS at 09:23

## 2019-10-22 RX ADMIN — SODIUM CHLORIDE, POTASSIUM CHLORIDE, SODIUM LACTATE AND CALCIUM CHLORIDE 125 ML/HR: 600; 310; 30; 20 INJECTION, SOLUTION INTRAVENOUS at 08:35

## 2019-10-22 RX ADMIN — OXYTOCIN 85 ML/HR: 10 INJECTION, SOLUTION INTRAMUSCULAR; INTRAVENOUS at 11:00

## 2019-10-22 RX ADMIN — ONDANSETRON 4 MG: 2 INJECTION INTRAMUSCULAR; INTRAVENOUS at 09:17

## 2019-10-22 RX ADMIN — METOCLOPRAMIDE 10 MG: 5 INJECTION, SOLUTION INTRAMUSCULAR; INTRAVENOUS at 09:36

## 2019-10-22 RX ADMIN — OXYCODONE HYDROCHLORIDE AND ACETAMINOPHEN 1 TABLET: 10; 325 TABLET ORAL at 23:49

## 2019-10-22 RX ADMIN — PROMETHAZINE HYDROCHLORIDE 25 MG: 25 TABLET ORAL at 15:06

## 2019-10-22 RX ADMIN — SODIUM CHLORIDE, POTASSIUM CHLORIDE, SODIUM LACTATE AND CALCIUM CHLORIDE: 600; 310; 30; 20 INJECTION, SOLUTION INTRAVENOUS at 09:40

## 2019-10-22 RX ADMIN — OXYCODONE HYDROCHLORIDE AND ACETAMINOPHEN 1 TABLET: 7.5; 325 TABLET ORAL at 15:01

## 2019-10-22 RX ADMIN — OXYCODONE HYDROCHLORIDE AND ACETAMINOPHEN 1 TABLET: 10; 325 TABLET ORAL at 12:41

## 2019-10-22 RX ADMIN — SIMETHICONE CHEW TAB 80 MG 80 MG: 80 TABLET ORAL at 20:20

## 2019-10-22 RX ADMIN — CEFAZOLIN SODIUM 2 G: 2 INJECTION, SOLUTION INTRAVENOUS at 09:13

## 2019-10-22 RX ADMIN — OXYCODONE HYDROCHLORIDE AND ACETAMINOPHEN 1 TABLET: 10; 325 TABLET ORAL at 17:49

## 2019-10-22 RX ADMIN — SODIUM CHLORIDE, POTASSIUM CHLORIDE, SODIUM LACTATE AND CALCIUM CHLORIDE 125 ML/HR: 600; 310; 30; 20 INJECTION, SOLUTION INTRAVENOUS at 15:22

## 2019-10-22 RX ADMIN — SODIUM CHLORIDE, POTASSIUM CHLORIDE, SODIUM LACTATE AND CALCIUM CHLORIDE 125 ML/HR: 600; 310; 30; 20 INJECTION, SOLUTION INTRAVENOUS at 09:13

## 2019-10-22 NOTE — ANESTHESIA POSTPROCEDURE EVALUATION
Patient: Kalyani Sandhu    Procedure Summary     Date:  10/22/19 Room / Location:  [facility] LABOR DELIVERY   [facility]OR DELIVERY    Anesthesia Start:  916 Anesthesia Stop:      Procedure:   SECTION PRIMARY (N/A Abdomen) Diagnosis:      Surgeon:  Marianela Renteria MD Provider:  Eliane Randolph DO    Anesthesia Type:  spinal, ITN ASA Status:  3          Anesthesia Type: spinal, ITN  Last vitals  /62   Temp 98.1   Pulse 97   Resp 17   SpO2 98     Post Anesthesia Care and Evaluation    Patient location during evaluation: bedside  Patient participation: complete - patient participated  Level of consciousness: awake and alert  Pain management: adequate  Airway patency: patent  Anesthetic complications: No anesthetic complications    Cardiovascular status: acceptable  Respiratory status: acceptable  Hydration status: acceptable

## 2019-10-22 NOTE — ANESTHESIA PREPROCEDURE EVALUATION
Anesthesia Evaluation     Patient summary reviewed and Nursing notes reviewed   NPO Solid Status: > 8 hours  NPO Liquid Status: > 8 hours           Airway   Dental      Pulmonary - negative pulmonary ROS   Cardiovascular     (+) hypertension,       Neuro/Psych- negative ROS  GI/Hepatic/Renal/Endo    (+)   diabetes mellitus gestational,     Musculoskeletal (-) negative ROS    Abdominal    Substance History - negative use     OB/GYN    (+) Pregnant, pregnancy induced hypertension        Other - negative ROS                       Anesthesia Plan    ASA 3     spinal and ITN

## 2019-10-22 NOTE — ANESTHESIA PROCEDURE NOTES
Spinal Block      Patient reassessed immediately prior to procedure    Patient location during procedure: OR  Indication:at surgeon's request  Performed By  Anesthesiologist: Eliane Randolph DO  CRNA: Linh Smith CRNA  Preanesthetic Checklist  Completed: patient identified, surgical consent, pre-op evaluation, timeout performed, IV checked, risks and benefits discussed and monitors and equipment checked  Spinal Block Prep:  Patient Position:sitting  Sterile Tech:cap, gloves, mask and sterile barriers  Prep:Betadine  Patient Monitoring:blood pressure monitoring, continuous pulse oximetry and EKG  Spinal Block Procedure  Approach:midline  Guidance:palpation technique  Location:L3-L4  Needle Type:Chris  Needle Gauge:25 G  Placement of Spinal needle event:cerebrospinal fluid aspirated  Paresthesia: no  Fluid Appearance:clear     Post Assessment  Patient Tolerance:patient tolerated the procedure well with no apparent complications  Complications no

## 2019-10-23 LAB
BASOPHILS # BLD AUTO: 0.02 10*3/MM3 (ref 0–0.2)
BASOPHILS NFR BLD AUTO: 0.3 % (ref 0–1.5)
DEPRECATED RDW RBC AUTO: 48.7 FL (ref 37–54)
EOSINOPHIL # BLD AUTO: 0.07 10*3/MM3 (ref 0–0.4)
EOSINOPHIL NFR BLD AUTO: 1.2 % (ref 0.3–6.2)
ERYTHROCYTE [DISTWIDTH] IN BLOOD BY AUTOMATED COUNT: 14.3 % (ref 12.3–15.4)
HCT VFR BLD AUTO: 32.6 % (ref 34–46.6)
HGB BLD-MCNC: 10.5 G/DL (ref 12–15.9)
IMM GRANULOCYTES # BLD AUTO: 0.05 10*3/MM3 (ref 0–0.05)
IMM GRANULOCYTES NFR BLD AUTO: 0.8 % (ref 0–0.5)
LYMPHOCYTES # BLD AUTO: 1.31 10*3/MM3 (ref 0.7–3.1)
LYMPHOCYTES NFR BLD AUTO: 21.8 % (ref 19.6–45.3)
MCH RBC QN AUTO: 30.3 PG (ref 26.6–33)
MCHC RBC AUTO-ENTMCNC: 32.2 G/DL (ref 31.5–35.7)
MCV RBC AUTO: 93.9 FL (ref 79–97)
MONOCYTES # BLD AUTO: 0.41 10*3/MM3 (ref 0.1–0.9)
MONOCYTES NFR BLD AUTO: 6.8 % (ref 5–12)
NEUTROPHILS # BLD AUTO: 4.16 10*3/MM3 (ref 1.7–7)
NEUTROPHILS NFR BLD AUTO: 69.1 % (ref 42.7–76)
NRBC BLD AUTO-RTO: 0 /100 WBC (ref 0–0.2)
PLATELET # BLD AUTO: 115 10*3/MM3 (ref 140–450)
PMV BLD AUTO: 12.5 FL (ref 6–12)
RBC # BLD AUTO: 3.47 10*6/MM3 (ref 3.77–5.28)
WBC NRBC COR # BLD: 6.02 10*3/MM3 (ref 3.4–10.8)

## 2019-10-23 PROCEDURE — 85025 COMPLETE CBC W/AUTO DIFF WBC: CPT | Performed by: OBSTETRICS & GYNECOLOGY

## 2019-10-23 RX ADMIN — SIMETHICONE CHEW TAB 80 MG 80 MG: 80 TABLET ORAL at 09:02

## 2019-10-23 RX ADMIN — OXYCODONE HYDROCHLORIDE AND ACETAMINOPHEN 1 TABLET: 10; 325 TABLET ORAL at 12:07

## 2019-10-23 RX ADMIN — IBUPROFEN 600 MG: 600 TABLET ORAL at 06:05

## 2019-10-23 RX ADMIN — SIMETHICONE CHEW TAB 80 MG 80 MG: 80 TABLET ORAL at 21:21

## 2019-10-23 RX ADMIN — OXYCODONE HYDROCHLORIDE AND ACETAMINOPHEN 1 TABLET: 10; 325 TABLET ORAL at 06:05

## 2019-10-23 RX ADMIN — IBUPROFEN 600 MG: 600 TABLET ORAL at 12:07

## 2019-10-23 RX ADMIN — IBUPROFEN 600 MG: 600 TABLET ORAL at 18:03

## 2019-10-23 RX ADMIN — OXYCODONE HYDROCHLORIDE AND ACETAMINOPHEN 1 TABLET: 5; 325 TABLET ORAL at 18:03

## 2019-10-23 RX ADMIN — DOCUSATE SODIUM 100 MG: 100 CAPSULE, LIQUID FILLED ORAL at 21:21

## 2019-10-23 RX ADMIN — DOCUSATE SODIUM 100 MG: 100 CAPSULE, LIQUID FILLED ORAL at 09:02

## 2019-10-24 LAB
ALP SERPL-CCNC: 85 U/L (ref 39–117)
ALT SERPL W P-5'-P-CCNC: 16 U/L (ref 1–33)
AST SERPL-CCNC: 17 U/L (ref 1–32)
BASOPHILS # BLD AUTO: 0.01 10*3/MM3 (ref 0–0.2)
BASOPHILS NFR BLD AUTO: 0.1 % (ref 0–1.5)
BILIRUB SERPL-MCNC: 0.2 MG/DL (ref 0.2–1.2)
CREAT BLD-MCNC: 0.81 MG/DL (ref 0.57–1)
DEPRECATED RDW RBC AUTO: 49.2 FL (ref 37–54)
EOSINOPHIL # BLD AUTO: 0.1 10*3/MM3 (ref 0–0.4)
EOSINOPHIL NFR BLD AUTO: 1.5 % (ref 0.3–6.2)
ERYTHROCYTE [DISTWIDTH] IN BLOOD BY AUTOMATED COUNT: 14.4 % (ref 12.3–15.4)
HCT VFR BLD AUTO: 35.8 % (ref 34–46.6)
HGB BLD-MCNC: 11.7 G/DL (ref 12–15.9)
IMM GRANULOCYTES # BLD AUTO: 0.05 10*3/MM3 (ref 0–0.05)
IMM GRANULOCYTES NFR BLD AUTO: 0.7 % (ref 0–0.5)
LDH SERPL-CCNC: 278 U/L (ref 135–214)
LYMPHOCYTES # BLD AUTO: 1.55 10*3/MM3 (ref 0.7–3.1)
LYMPHOCYTES NFR BLD AUTO: 23.2 % (ref 19.6–45.3)
MCH RBC QN AUTO: 31 PG (ref 26.6–33)
MCHC RBC AUTO-ENTMCNC: 32.7 G/DL (ref 31.5–35.7)
MCV RBC AUTO: 94.7 FL (ref 79–97)
MONOCYTES # BLD AUTO: 0.39 10*3/MM3 (ref 0.1–0.9)
MONOCYTES NFR BLD AUTO: 5.8 % (ref 5–12)
NEUTROPHILS # BLD AUTO: 4.57 10*3/MM3 (ref 1.7–7)
NEUTROPHILS NFR BLD AUTO: 68.7 % (ref 42.7–76)
NRBC BLD AUTO-RTO: 0 /100 WBC (ref 0–0.2)
PLATELET # BLD AUTO: 130 10*3/MM3 (ref 140–450)
PMV BLD AUTO: 12.2 FL (ref 6–12)
RBC # BLD AUTO: 3.78 10*6/MM3 (ref 3.77–5.28)
URATE SERPL-MCNC: 5.5 MG/DL (ref 2.4–5.7)
WBC NRBC COR # BLD: 6.67 10*3/MM3 (ref 3.4–10.8)

## 2019-10-24 PROCEDURE — 82247 BILIRUBIN TOTAL: CPT | Performed by: NURSE PRACTITIONER

## 2019-10-24 PROCEDURE — 83615 LACTATE (LD) (LDH) ENZYME: CPT | Performed by: NURSE PRACTITIONER

## 2019-10-24 PROCEDURE — 84460 ALANINE AMINO (ALT) (SGPT): CPT | Performed by: NURSE PRACTITIONER

## 2019-10-24 PROCEDURE — 84450 TRANSFERASE (AST) (SGOT): CPT | Performed by: NURSE PRACTITIONER

## 2019-10-24 PROCEDURE — 85025 COMPLETE CBC W/AUTO DIFF WBC: CPT | Performed by: NURSE PRACTITIONER

## 2019-10-24 PROCEDURE — 84075 ASSAY ALKALINE PHOSPHATASE: CPT | Performed by: NURSE PRACTITIONER

## 2019-10-24 PROCEDURE — 84550 ASSAY OF BLOOD/URIC ACID: CPT | Performed by: NURSE PRACTITIONER

## 2019-10-24 PROCEDURE — 82565 ASSAY OF CREATININE: CPT | Performed by: NURSE PRACTITIONER

## 2019-10-24 RX ADMIN — OXYCODONE HYDROCHLORIDE AND ACETAMINOPHEN 1 TABLET: 5; 325 TABLET ORAL at 23:13

## 2019-10-24 RX ADMIN — OXYCODONE HYDROCHLORIDE AND ACETAMINOPHEN 1 TABLET: 10; 325 TABLET ORAL at 06:01

## 2019-10-24 RX ADMIN — OXYCODONE HYDROCHLORIDE AND ACETAMINOPHEN 1 TABLET: 5; 325 TABLET ORAL at 00:51

## 2019-10-24 RX ADMIN — SIMETHICONE CHEW TAB 80 MG 80 MG: 80 TABLET ORAL at 12:00

## 2019-10-24 RX ADMIN — SIMETHICONE CHEW TAB 80 MG 80 MG: 80 TABLET ORAL at 08:28

## 2019-10-24 RX ADMIN — POLYETHYLENE GLYCOL 3350 17 G: 17 POWDER, FOR SOLUTION ORAL at 08:28

## 2019-10-24 RX ADMIN — OXYCODONE HYDROCHLORIDE AND ACETAMINOPHEN 1 TABLET: 5; 325 TABLET ORAL at 00:04

## 2019-10-24 RX ADMIN — IBUPROFEN 600 MG: 600 TABLET ORAL at 12:00

## 2019-10-24 RX ADMIN — IBUPROFEN 600 MG: 600 TABLET ORAL at 23:13

## 2019-10-24 RX ADMIN — IBUPROFEN 600 MG: 600 TABLET ORAL at 06:01

## 2019-10-24 RX ADMIN — OXYCODONE HYDROCHLORIDE AND ACETAMINOPHEN 1 TABLET: 10; 325 TABLET ORAL at 17:40

## 2019-10-24 RX ADMIN — OXYCODONE HYDROCHLORIDE AND ACETAMINOPHEN 1 TABLET: 10; 325 TABLET ORAL at 12:00

## 2019-10-24 RX ADMIN — IBUPROFEN 600 MG: 600 TABLET ORAL at 00:04

## 2019-10-24 RX ADMIN — IBUPROFEN 600 MG: 600 TABLET ORAL at 17:40

## 2019-10-25 RX ORDER — NIFEDIPINE 30 MG/1
30 TABLET, EXTENDED RELEASE ORAL
Status: DISCONTINUED | OUTPATIENT
Start: 2019-10-25 | End: 2019-10-26 | Stop reason: HOSPADM

## 2019-10-25 RX ORDER — NIFEDIPINE 10 MG/1
10 CAPSULE ORAL ONCE
Status: COMPLETED | OUTPATIENT
Start: 2019-10-25 | End: 2019-10-25

## 2019-10-25 RX ADMIN — POLYETHYLENE GLYCOL 3350 17 G: 17 POWDER, FOR SOLUTION ORAL at 08:51

## 2019-10-25 RX ADMIN — SIMETHICONE CHEW TAB 80 MG 80 MG: 80 TABLET ORAL at 08:51

## 2019-10-25 RX ADMIN — DOCUSATE SODIUM 100 MG: 100 CAPSULE, LIQUID FILLED ORAL at 08:51

## 2019-10-25 RX ADMIN — SIMETHICONE CHEW TAB 80 MG 80 MG: 80 TABLET ORAL at 17:40

## 2019-10-25 RX ADMIN — IBUPROFEN 600 MG: 600 TABLET ORAL at 05:55

## 2019-10-25 RX ADMIN — IBUPROFEN 600 MG: 600 TABLET ORAL at 18:53

## 2019-10-25 RX ADMIN — NIFEDIPINE 10 MG: 10 CAPSULE ORAL at 05:00

## 2019-10-25 RX ADMIN — OXYCODONE HYDROCHLORIDE AND ACETAMINOPHEN 1 TABLET: 5; 325 TABLET ORAL at 13:04

## 2019-10-25 RX ADMIN — OXYCODONE HYDROCHLORIDE AND ACETAMINOPHEN 1 TABLET: 5; 325 TABLET ORAL at 05:55

## 2019-10-25 RX ADMIN — SIMETHICONE CHEW TAB 80 MG 80 MG: 80 TABLET ORAL at 13:04

## 2019-10-25 RX ADMIN — IBUPROFEN 600 MG: 600 TABLET ORAL at 13:04

## 2019-10-25 RX ADMIN — NIFEDIPINE 30 MG: 30 TABLET, FILM COATED, EXTENDED RELEASE ORAL at 10:08

## 2019-10-26 VITALS
WEIGHT: 211 LBS | TEMPERATURE: 98 F | DIASTOLIC BLOOD PRESSURE: 79 MMHG | BODY MASS INDEX: 33.05 KG/M2 | RESPIRATION RATE: 16 BRPM | SYSTOLIC BLOOD PRESSURE: 140 MMHG | HEART RATE: 88 BPM | OXYGEN SATURATION: 98 %

## 2019-10-26 PROBLEM — O16.9 ELEVATED BLOOD PRESSURE AFFECTING PREGNANCY, ANTEPARTUM: Status: RESOLVED | Noted: 2019-08-22 | Resolved: 2019-10-26

## 2019-10-26 PROBLEM — O16.9 ELEVATED BLOOD PRESSURE COMPLICATING PREGNANCY, ANTEPARTUM: Status: RESOLVED | Noted: 2019-08-23 | Resolved: 2019-10-26

## 2019-10-26 RX ORDER — NIFEDIPINE 30 MG/1
30 TABLET, FILM COATED, EXTENDED RELEASE ORAL
Qty: 30 TABLET | Refills: 0 | Status: SHIPPED | OUTPATIENT
Start: 2019-10-26

## 2019-10-26 RX ORDER — IBUPROFEN 600 MG/1
600 TABLET ORAL EVERY 6 HOURS PRN
Qty: 30 TABLET | Refills: 0 | Status: SHIPPED | OUTPATIENT
Start: 2019-10-26

## 2019-10-26 RX ORDER — OXYCODONE HYDROCHLORIDE AND ACETAMINOPHEN 5; 325 MG/1; MG/1
1-2 TABLET ORAL EVERY 4 HOURS PRN
Qty: 18 TABLET | Refills: 0 | Status: SHIPPED | OUTPATIENT
Start: 2019-10-26 | End: 2019-10-29

## 2019-10-26 RX ADMIN — IBUPROFEN 600 MG: 600 TABLET ORAL at 00:12

## 2019-10-26 RX ADMIN — IBUPROFEN 600 MG: 600 TABLET ORAL at 06:27

## 2019-10-26 RX ADMIN — NIFEDIPINE 30 MG: 30 TABLET, FILM COATED, EXTENDED RELEASE ORAL at 09:47

## 2020-12-14 ENCOUNTER — OFFICE VISIT (OUTPATIENT)
Dept: OBSTETRICS AND GYNECOLOGY | Facility: CLINIC | Age: 30
End: 2020-12-14

## 2020-12-14 VITALS
SYSTOLIC BLOOD PRESSURE: 122 MMHG | BODY MASS INDEX: 30.05 KG/M2 | HEIGHT: 66 IN | DIASTOLIC BLOOD PRESSURE: 88 MMHG | WEIGHT: 187 LBS

## 2020-12-14 DIAGNOSIS — Z01.419 WOMEN'S ANNUAL ROUTINE GYNECOLOGICAL EXAMINATION: ICD-10-CM

## 2020-12-14 DIAGNOSIS — Z01.419 PAP TEST, AS PART OF ROUTINE GYNECOLOGICAL EXAMINATION: Primary | ICD-10-CM

## 2020-12-14 DIAGNOSIS — D25.1 INTRAMURAL LEIOMYOMA OF UTERUS: ICD-10-CM

## 2020-12-14 PROCEDURE — 99395 PREV VISIT EST AGE 18-39: CPT | Performed by: OBSTETRICS & GYNECOLOGY

## 2020-12-14 NOTE — PROGRESS NOTES
GYN Annual Exam     CC - Here for annual exam.        MICHAEL  Kalyani Sandhu is a 30 y.o. female, , who presents for annual well woman exam. Patient's last menstrual period was 2020..  Periods are regular every 25-35 days, lasting 6 days. .  Dysmenorrhea:moderate, occurring throughout menses.  Patient reports problems with: heavy bleeding at times.  There were no changes to her medical or surgical history since her last visit.. Partner Status: Marital Status: .  New Partners since last visit: no.  Desires STD Screening: no.    Additional OB/GYN History   Current contraception: contraceptive methods: Condoms  Desires to: do not start contraception  Last Pap :   Last Completed Pap Smear       Status Date      PAP SMEAR Done 2019 negative        History of abnormal Pap smear: no  Family history of uterine, colon, breast, or ovarian cancer: yes - PGM with h/o colon CA  Performs monthly Self-Breast Exam: yes  Exercises Regularly:no  Feelings of Anxiety or Depression: no  Tobacco Usage?: No   OB History        1    Para   1    Term   1            AB        Living   1       SAB        TAB        Ectopic        Molar        Multiple   0    Live Births   1                Health Maintenance   Topic Date Due   • ANNUAL PHYSICAL  1992   • TDAP/TD VACCINES (1 - Tdap) 2008   • INFLUENZA VACCINE  2020   • Annual Gynecologic Pelvic and Breast Exam  12/15/2021   • PAP SMEAR  2022   • HEPATITIS C SCREENING  Completed   • Hepatitis B  Aged Out   • Pneumococcal Vaccine 0-64  Aged Out       The additional following portions of the patient's history were reviewed and updated as appropriate: allergies, current medications, past family history, past medical history, past social history and past surgical history.    Review of Systems   Constitutional: Negative.    HENT: Negative.    Respiratory: Negative.    Cardiovascular: Negative.    Gastrointestinal: Negative.   "  Genitourinary: Negative.    Musculoskeletal: Negative.    Skin: Negative.    Allergic/Immunologic: Negative.    Neurological: Negative.    Hematological: Negative.    Psychiatric/Behavioral: Negative.      All other systems reviewed and are negative.     I have reviewed and agree with the HPI, ROS, and historical information as entered above. Marianela Renteria MD    Objective   /88   Ht 167.6 cm (66\")   Wt 84.8 kg (187 lb)   LMP 12/04/2020   Breastfeeding No   BMI 30.18 kg/m²     Physical Exam  Vitals signs and nursing note reviewed. Exam conducted with a chaperone present.   Constitutional:       Appearance: She is well-developed.   HENT:      Head: Normocephalic and atraumatic.   Eyes:      Pupils: Pupils are equal, round, and reactive to light.   Neck:      Musculoskeletal: Normal range of motion. No muscular tenderness.      Thyroid: No thyroid mass or thyromegaly.   Pulmonary:      Effort: Pulmonary effort is normal. No retractions.   Chest:      Chest wall: No mass.      Breasts:         Right: Normal. No mass, nipple discharge, skin change or tenderness.         Left: Normal. No mass, nipple discharge, skin change or tenderness.   Abdominal:      General: Bowel sounds are normal.      Palpations: Abdomen is soft. Abdomen is not rigid. There is no mass.      Tenderness: There is no abdominal tenderness. There is no guarding.      Hernia: No hernia is present. There is no hernia in the left inguinal area or right inguinal area.   Genitourinary:     Exam position: Lithotomy position.      Pubic Area: No rash.       Labia:         Right: No rash, tenderness or lesion.         Left: No rash, tenderness or lesion.       Urethra: No urethral pain or urethral swelling.      Vagina: Normal. No vaginal discharge or lesions.      Cervix: No cervical motion tenderness, discharge, lesion or cervical bleeding.      Uterus: Normal. Not enlarged, not fixed and not tender.       Adnexa:         Right: No mass, " tenderness or fullness.          Left: No mass, tenderness or fullness.        Rectum: No external hemorrhoid.   Musculoskeletal:      Right lower leg: No edema.      Left lower leg: No edema.   Skin:     General: Skin is warm and dry.   Neurological:      Mental Status: She is alert and oriented to person, place, and time.      Motor: Motor function is intact.   Psychiatric:         Mood and Affect: Mood and affect normal.         Behavior: Behavior normal.            Assessment and Plan    Problem List Items Addressed This Visit     None      Visit Diagnoses     Pap test, as part of routine gynecological examination    -  Primary    Relevant Orders    Pap IG, HPV-hr    Women's annual routine gynecological examination        Relevant Orders    US Non-ob Transvaginal    Intramural leiomyoma of uterus        4cm          1. GYN annual well woman exam.   2. Preconceptual Counseling.  Discussed timed intercourse, regular cycles, prenatal vitamins, and when to call.  3. Reviewed monthly self breast exams.  Instructed to call with lumps, pain, or breast discharge.    4. Other: she may try and get pregnant again this summer. will make an appt then for US to look at the fiibroid we found in G1 pregnancy. she is asymptomatic      Marianela Renteria MD  12/14/2020

## 2020-12-21 DIAGNOSIS — Z01.419 PAP TEST, AS PART OF ROUTINE GYNECOLOGICAL EXAMINATION: ICD-10-CM

## 2021-06-10 ENCOUNTER — OFFICE VISIT (OUTPATIENT)
Dept: OBSTETRICS AND GYNECOLOGY | Facility: CLINIC | Age: 31
End: 2021-06-10

## 2021-06-10 VITALS — DIASTOLIC BLOOD PRESSURE: 96 MMHG | BODY MASS INDEX: 30.54 KG/M2 | SYSTOLIC BLOOD PRESSURE: 138 MMHG | WEIGHT: 189.2 LBS

## 2021-06-10 DIAGNOSIS — Z30.09 FAMILY PLANNING: ICD-10-CM

## 2021-06-10 DIAGNOSIS — D21.9 FIBROIDS: Primary | ICD-10-CM

## 2021-06-10 PROCEDURE — 99213 OFFICE O/P EST LOW 20 MIN: CPT | Performed by: OBSTETRICS & GYNECOLOGY

## 2021-06-10 RX ORDER — METHOCARBAMOL 500 MG/1
TABLET, FILM COATED ORAL
COMMUNITY
Start: 2021-04-01

## 2021-06-10 NOTE — PROGRESS NOTES
Chief Complaint   Patient presents with   • Follow-up     fibroids       Subjective   HPI  Kalyani Sandhu is a 30 y.o. female, , who presents for follow up on fibroids.      She states she has experienced this problem for 2 years.  She describes the severity as mild.  She states that the problem is worsening.  The patient reports additional symptoms as pelvic pain.  Patient reports she has moderate cramping when she is on her period but usually does not have any when she is not menstruating.     Her last LMP was Patient's last menstrual period was 2021..  Periods are regular every 28-30 days, lasting 6 days.  Dysmenorrhea:moderate, occurring first 1-2 days of flow.  Patient reports problems with: anxiety.  Partner Status: Marital Status: .  New Partners since last visit: no.  Desires STD Screening: no.    On US today: 2 fibroids measured, both 1cm or less. Ovaries nl. 4cm fibroid not seen      Additional OB/GYN History   Current contraception: contraceptive methods: Condoms  Desires to: do not start contraception  Last Pap :   Last Completed Pap Smear          PAP SMEAR (Every 3 Years) Next due on 2023  Pap IG, HPV-hr    2019  Done - negative              History of abnormal Pap smear: no  Last mammogram:   Last Completed Mammogram     This patient has no relevant Health Maintenance data.        Tobacco Usage?: No   OB History        1    Para   1    Term   1            AB        Living   1       SAB        TAB        Ectopic        Molar        Multiple   0    Live Births   1                Health Maintenance   Topic Date Due   • ANNUAL PHYSICAL  Never done   • INFLUENZA VACCINE  2021   • Annual Gynecologic Pelvic and Breast Exam  12/15/2021   • PAP SMEAR  2023   • TDAP/TD VACCINES (2 - Td or Tdap) 2026   • HEPATITIS C SCREENING  Completed   • COVID-19 Vaccine  Completed   • Hepatitis B  Aged Out   • Pneumococcal Vaccine 0-64  Aged  Out       The additional following portions of the patient's history were reviewed and updated as appropriate: allergies, current medications, past family history, past medical history, past social history and past surgical history.    Review of Systems   Constitutional: Negative.    HENT: Negative.    Eyes: Negative.    Respiratory: Negative.    Cardiovascular: Negative.    Gastrointestinal: Negative.    Endocrine: Negative.    Genitourinary: Positive for pelvic pain.   Musculoskeletal: Negative.    Skin: Negative.    Allergic/Immunologic: Negative.    Neurological: Negative.    Hematological: Negative.    Psychiatric/Behavioral: The patient is nervous/anxious.        I have reviewed and agree with the HPI, ROS, and historical information as entered above. Marianela Renteria MD    Objective   /96   Wt 85.8 kg (189 lb 3.2 oz)   LMP 06/07/2021   BMI 30.54 kg/m²     Physical Exam  Vitals and nursing note reviewed.   Constitutional:       Appearance: Normal appearance.   HENT:      Head: Normocephalic and atraumatic.   Eyes:      General: No scleral icterus.     Pupils: Pupils are equal, round, and reactive to light.   Pulmonary:      Effort: Pulmonary effort is normal.      Breath sounds: Normal breath sounds.   Abdominal:      General: Bowel sounds are normal. There is no distension.      Palpations: Abdomen is soft.      Tenderness: There is no abdominal tenderness.   Musculoskeletal:         General: Normal range of motion.      Cervical back: Normal range of motion and neck supple.      Right lower leg: No edema.      Left lower leg: No edema.   Skin:     General: Skin is warm and dry.   Neurological:      General: No focal deficit present.      Mental Status: She is alert and oriented to person, place, and time.   Psychiatric:         Mood and Affect: Mood normal.         Behavior: Behavior normal. Behavior is cooperative.         Assessment/Plan     Assessment     Problem List Items Addressed This Visit      None      Visit Diagnoses     Fibroids    -  Primary    Family planning              1. No significant fibroids seen on US today.   2. Kalyani has also been having some anxiety issues and would like to get those addressed before she gets pregnant again.     Plan     No follow-ups on file.      Marianela Renteria MD  06/10/2021

## 2022-01-17 ENCOUNTER — TELEPHONE (OUTPATIENT)
Dept: OBSTETRICS AND GYNECOLOGY | Facility: CLINIC | Age: 32
End: 2022-01-17

## 2022-01-17 NOTE — TELEPHONE ENCOUNTER
S/w pt she wanted to know the blood pressure medication she was on in the hospital in 2019. I told her per our records it shows procardia. She v/u

## 2023-07-19 PROBLEM — Z98.891 PREVIOUS CESAREAN SECTION: Status: ACTIVE | Noted: 2023-07-19

## 2023-07-19 PROBLEM — F41.9 ANXIETY DURING PREGNANCY: Status: ACTIVE | Noted: 2023-07-19

## 2023-07-19 PROBLEM — O10.919 CHRONIC HYPERTENSION AFFECTING PREGNANCY: Status: ACTIVE | Noted: 2023-07-19

## 2023-07-19 PROBLEM — O99.340 ANXIETY DURING PREGNANCY: Status: ACTIVE | Noted: 2023-07-19

## 2023-07-19 PROBLEM — Z34.90 PREGNANCY: Status: ACTIVE | Noted: 2023-07-19

## 2023-07-19 PROBLEM — O09.899 PREVIOUS PREGNANCY COMPLICATED BY PREGNANCY-INDUCED HYPERTENSION, ANTEPARTUM: Status: ACTIVE | Noted: 2023-07-19

## 2023-07-19 PROBLEM — O09.299 PREVIOUS GESTATIONAL DIABETES MELLITUS, ANTEPARTUM: Status: ACTIVE | Noted: 2023-07-19

## 2023-07-19 PROBLEM — Z86.32 PREVIOUS GESTATIONAL DIABETES MELLITUS, ANTEPARTUM: Status: ACTIVE | Noted: 2023-07-19

## 2023-07-19 PROBLEM — O13.9 GESTATIONAL HYPERTENSION: Status: RESOLVED | Noted: 2019-10-22 | Resolved: 2023-07-19

## 2023-08-16 ENCOUNTER — ROUTINE PRENATAL (OUTPATIENT)
Dept: OBSTETRICS AND GYNECOLOGY | Facility: CLINIC | Age: 33
End: 2023-08-16
Payer: COMMERCIAL

## 2023-08-16 VITALS — WEIGHT: 211 LBS | DIASTOLIC BLOOD PRESSURE: 80 MMHG | SYSTOLIC BLOOD PRESSURE: 110 MMHG | BODY MASS INDEX: 34.06 KG/M2

## 2023-08-16 DIAGNOSIS — O15.03: ICD-10-CM

## 2023-08-16 DIAGNOSIS — Z3A.11 11 WEEKS GESTATION OF PREGNANCY: Primary | ICD-10-CM

## 2023-08-16 DIAGNOSIS — O09.299 PREVIOUS GESTATIONAL DIABETES MELLITUS, ANTEPARTUM: ICD-10-CM

## 2023-08-16 DIAGNOSIS — O10.919 CHRONIC HYPERTENSION AFFECTING PREGNANCY: ICD-10-CM

## 2023-08-16 DIAGNOSIS — O99.340 ANXIETY DURING PREGNANCY: ICD-10-CM

## 2023-08-16 DIAGNOSIS — Z98.891 PREVIOUS CESAREAN SECTION: ICD-10-CM

## 2023-08-16 DIAGNOSIS — Z86.32 PREVIOUS GESTATIONAL DIABETES MELLITUS, ANTEPARTUM: ICD-10-CM

## 2023-08-16 DIAGNOSIS — F41.9 ANXIETY DURING PREGNANCY: ICD-10-CM

## 2023-08-16 DIAGNOSIS — Z13.1 ENCOUNTER FOR SCREENING FOR DIABETES MELLITUS: ICD-10-CM

## 2023-08-16 LAB
EXPIRATION DATE: 1
GLUCOSE UR STRIP-MCNC: NEGATIVE MG/DL
Lab: 1
PROT UR STRIP-MCNC: NEGATIVE MG/DL

## 2023-08-16 RX ORDER — CETIRIZINE HYDROCHLORIDE 10 MG/1
10 TABLET ORAL DAILY
COMMUNITY

## 2023-08-16 NOTE — PROGRESS NOTES
OB FOLLOW UP  CC- Here for care of pregnancy        Kalyani Sandhu is a 32 y.o.  11w5d patient being seen today for her obstetrical follow up visit. Patient reports no complaints.. Patient having early 1 Hour GTT    Her prenatal care is complicated by (and status) : Chronic HTN. Her average BP has been normal.  Patient Active Problem List   Diagnosis    Pregnancy    Previous  section    Chronic hypertension affecting pregnancy    Previous gestational diabetes mellitus, antepartum    Anxiety during pregnancy       Desires genetic testing?: No  Flu Status: Declines  Ultrasound Today: No    ROS -   Patient Reports : No Problems  Patient Denies: Loss of Fluid, Vaginal Spotting, Vision Changes, Headaches, Nausea , and Vomiting   Fetal Movement :  to early  All other systems reviewed and are negative.     The additional following portions of the patient's history were reviewed and updated as appropriate: allergies and current medications.    I have reviewed and agree with the HPI, ROS, and historical information as entered above. Marianela Renteria MD          /80   Wt 95.7 kg (211 lb)   LMP 2023   BMI 34.06 kg/mý         EXAM:     Prenatal Vitals  BP: 110/80  Weight: 95.7 kg (211 lb)   Fetal Heart Rate: +          Urine Glucose Read-only: Negative  Urine Protein Read-only: Negative       Assessment and Plan    Problem List Items Addressed This Visit          Gynecologic and Obstetric Problems    Chronic hypertension affecting pregnancy    Overview     Labetelol 100 BID at nob, baseline PEP 24h urine  Baby asa         Relevant Medications    labetalol (NORMODYNE) 100 MG tablet    Anxiety during pregnancy    Overview     Zoloft at nob         Relevant Medications    sertraline (ZOLOFT) 50 MG tablet       Other    Pregnancy - Primary    Overview     Prev c/s 37 wks 8#2oz         Relevant Orders    POC Glucose, Urine, Qualitative, Dipstick (Completed)    POC Protein, Urine, Qualitative,  Dipstick (Completed)    Previous  section    Overview     Desires repeat c/s         Previous gestational diabetes mellitus, antepartum    Overview     Early glucola          Other Visit Diagnoses       Encounter for screening for diabetes mellitus        Relevant Orders    CBC (No Diff)    Gestational Screen 1 Hr ([facility])    Antibody Screen    Eclampsia complicating pregnancy, third trimester        Relevant Orders    Protein, Urine, 24 Hour - Urine, Clean Catch            Pregnancy at 11w5d  She turned in her 24h urine today. Baseline PEP was normal. Start baby asa.   Early glucola today.   Labs reviewed from New OB Visit.  Counseled on genetic testing, carrier status and option for NT screen  Activity and Exercise discussed.  Patient is on Prenatal vitamins  Return in about 1 month (around 2023) for F/U Prenatal.    Marianela Renteria MD  2023

## 2023-08-17 LAB
BLD GP AB SCN SERPL QL: NEGATIVE
ERYTHROCYTE [DISTWIDTH] IN BLOOD BY AUTOMATED COUNT: 13 % (ref 11.7–15.4)
GLUCOSE 1H P 50 G GLC PO SERPL-MCNC: 133 MG/DL (ref 70–139)
HCT VFR BLD AUTO: 36.6 % (ref 34–46.6)
HGB BLD-MCNC: 12.7 G/DL (ref 11.1–15.9)
MCH RBC QN AUTO: 31 PG (ref 26.6–33)
MCHC RBC AUTO-ENTMCNC: 34.7 G/DL (ref 31.5–35.7)
MCV RBC AUTO: 89 FL (ref 79–97)
PLATELET # BLD AUTO: 179 X10E3/UL (ref 150–450)
PROT 24H UR-MRATE: 120 MG/24 HR (ref 30–150)
PROT UR-MCNC: 6.3 MG/DL
RBC # BLD AUTO: 4.1 X10E6/UL (ref 3.77–5.28)
WBC # BLD AUTO: 7.5 X10E3/UL (ref 3.4–10.8)

## 2023-09-13 ENCOUNTER — ROUTINE PRENATAL (OUTPATIENT)
Dept: OBSTETRICS AND GYNECOLOGY | Facility: CLINIC | Age: 33
End: 2023-09-13
Payer: COMMERCIAL

## 2023-09-13 VITALS — BODY MASS INDEX: 34.86 KG/M2 | SYSTOLIC BLOOD PRESSURE: 110 MMHG | WEIGHT: 216 LBS | DIASTOLIC BLOOD PRESSURE: 80 MMHG

## 2023-09-13 DIAGNOSIS — O09.299 PREVIOUS GESTATIONAL DIABETES MELLITUS, ANTEPARTUM: ICD-10-CM

## 2023-09-13 DIAGNOSIS — Z98.891 PREVIOUS CESAREAN SECTION: ICD-10-CM

## 2023-09-13 DIAGNOSIS — O10.919 CHRONIC HYPERTENSION AFFECTING PREGNANCY: ICD-10-CM

## 2023-09-13 DIAGNOSIS — O99.340 ANXIETY DURING PREGNANCY: ICD-10-CM

## 2023-09-13 DIAGNOSIS — F41.9 ANXIETY DURING PREGNANCY: ICD-10-CM

## 2023-09-13 DIAGNOSIS — Z86.32 PREVIOUS GESTATIONAL DIABETES MELLITUS, ANTEPARTUM: ICD-10-CM

## 2023-09-13 DIAGNOSIS — Z3A.15 15 WEEKS GESTATION OF PREGNANCY: Primary | ICD-10-CM

## 2023-09-13 LAB
EXPIRATION DATE: 1
GLUCOSE UR STRIP-MCNC: NEGATIVE MG/DL
Lab: 1
PROT UR STRIP-MCNC: NEGATIVE MG/DL

## 2023-09-13 NOTE — PROGRESS NOTES
OB FOLLOW UP  CC- Here for care of pregnancy        Kalyani Sandhu is a 33 y.o.  15w5d patient being seen today for her obstetrical follow up visit. Patient reports heartburn. She is not taking OTC medication for treatment currently.     Her prenatal care is complicated by (and status) : Chronic HTN. Her average BP has been 110/80.  Patient Active Problem List   Diagnosis    Pregnancy    Previous  section    Chronic hypertension affecting pregnancy    Previous gestational diabetes mellitus, antepartum    Anxiety during pregnancy       Flu Status: Declines  Ultrasound Today: No    AFP: declines    ROS -   Patient Reports : No Problems  Patient Denies: Loss of Fluid, Vaginal Spotting, Vision Changes, Headaches, Nausea , and Vomiting   Fetal Movement : absent  All other systems reviewed and are negative.       The additional following portions of the patient's history were reviewed and updated as appropriate: allergies and current medications.      I have reviewed and agree with the HPI, ROS, and historical information as entered above. Marianela Renteria MD          EXAM:     Prenatal Vitals  BP: 110/80  Weight: 98 kg (216 lb)   Fetal Heart Rate: +   Pelvic Exam:        Urine Glucose Read-only: Negative  Urine Protein Read-only: Negative           Assessment and Plan    Problem List Items Addressed This Visit          Gynecologic and Obstetric Problems    Chronic hypertension affecting pregnancy    Overview     Labetelol 100 BID at nob, baseline PEP 24h urine 120mg  Baby asa         Relevant Medications    labetalol (NORMODYNE) 100 MG tablet    Other Relevant Orders    [facility]    Anxiety during pregnancy    Overview     Zoloft at nob         Relevant Medications    sertraline (ZOLOFT) 50 MG tablet    Other Relevant Orders    [facility]  Diagn[facility] Center       Other    Pregnancy - Primary    Overview     Prev c/s 37 wks 8#2oz         Relevant Orders    POC Glucose,  Urine, Qualitative, Dipstick (Completed)    POC Protein, Urine, Qualitative, Dipstick (Completed)    Previous  section    Overview     Desires repeat c/s         [facility]    Previous gestational diabetes mellitus, antepartum    Overview     Early glucola wnl         [facility]    [facility] [place]       Pregnancy at 15w5d  Fetal status reassuring.   Counseled on MSAFP alone in relation to OTD and placental issues.    Anatomy scan next visit at [facility].   Activity and Exercise discussed.  Patient is on Prenatal vitamins  Return in about 1 month (around 10/13/2023) for F/U Prenatal, Refer - PCP.    Marianela Renteria MD  2023

## 2023-09-25 ENCOUNTER — TELEPHONE (OUTPATIENT)
Dept: OBSTETRICS AND GYNECOLOGY | Facility: CLINIC | Age: 33
End: 2023-09-25

## 2023-09-25 NOTE — TELEPHONE ENCOUNTER
Sonali patient  17w3d  MBT: A+    States yesterday morning when she woke up and went to the bathroom she would wipe and there was some old blood. She has panty liner on and has not had any on that. Denies cramping. Denies recent IC. She states Saturday she was pretty physical. She has felt baby moving.   Advised patient this can happen with heavy lifting and increase physical activity. Pelvic rest, call back if heavy bleeding and severe pain. Patient v/u.

## 2023-09-25 NOTE — TELEPHONE ENCOUNTER
Provider: DR. ROCHA    Caller: EMILY MORENO    Relationship to Patient: =SELF    Pharmacy: [facility] @ [address] [place] [address]     Phone Number: [phone]    Reason for Call: PT ADV WHEN SHE WIPES SHE IS SEEING BLOOD. FIRST OCCURRED WHEN SHE WOKE YESTERDAY AM. NO PAIN/CRAMPING. BABY IS MOVING. JUST WANTED TO CHECK IN ON THAT.    When was the patient last seen: 09-13-23

## 2023-10-12 ENCOUNTER — OFFICE VISIT (OUTPATIENT)
Dept: OBSTETRICS AND GYNECOLOGY | Facility: HOSPITAL | Age: 33
End: 2023-10-12
Payer: COMMERCIAL

## 2023-10-12 ENCOUNTER — HOSPITAL ENCOUNTER (OUTPATIENT)
Dept: WOMENS IMAGING | Facility: HOSPITAL | Age: 33
Discharge: HOME | End: 2023-10-12
Admitting: OBSTETRICS & GYNECOLOGY
Payer: COMMERCIAL

## 2023-10-12 ENCOUNTER — ROUTINE PRENATAL (OUTPATIENT)
Dept: OBSTETRICS AND GYNECOLOGY | Facility: CLINIC | Age: 33
End: 2023-10-12
Payer: COMMERCIAL

## 2023-10-12 VITALS — WEIGHT: 218.4 LBS | SYSTOLIC BLOOD PRESSURE: 130 MMHG | BODY MASS INDEX: 35.25 KG/M2 | DIASTOLIC BLOOD PRESSURE: 82 MMHG

## 2023-10-12 VITALS — WEIGHT: 218 LBS | SYSTOLIC BLOOD PRESSURE: 147 MMHG | DIASTOLIC BLOOD PRESSURE: 74 MMHG | BODY MASS INDEX: 35.19 KG/M2

## 2023-10-12 DIAGNOSIS — O99.340 ANXIETY DURING PREGNANCY: ICD-10-CM

## 2023-10-12 DIAGNOSIS — F41.9 ANXIETY DURING PREGNANCY: ICD-10-CM

## 2023-10-12 DIAGNOSIS — Z3A.19 19 WEEKS GESTATION OF PREGNANCY: ICD-10-CM

## 2023-10-12 DIAGNOSIS — Z98.891 PREVIOUS CESAREAN SECTION: ICD-10-CM

## 2023-10-12 DIAGNOSIS — O10.919 CHRONIC HYPERTENSION AFFECTING PREGNANCY: ICD-10-CM

## 2023-10-12 DIAGNOSIS — Z86.32 PREVIOUS GESTATIONAL DIABETES MELLITUS, ANTEPARTUM: ICD-10-CM

## 2023-10-12 DIAGNOSIS — O10.919 CHRONIC HYPERTENSION AFFECTING PREGNANCY: Primary | ICD-10-CM

## 2023-10-12 DIAGNOSIS — O09.299 PREVIOUS GESTATIONAL DIABETES MELLITUS, ANTEPARTUM: ICD-10-CM

## 2023-10-12 DIAGNOSIS — Z82.79 FAMILY HISTORY OF CONGENITAL HEART DEFECT: ICD-10-CM

## 2023-10-12 DIAGNOSIS — Z34.92 PRENATAL CARE IN SECOND TRIMESTER: Primary | ICD-10-CM

## 2023-10-12 DIAGNOSIS — Z34.90 PREGNANCY, UNSPECIFIED GESTATIONAL AGE: ICD-10-CM

## 2023-10-12 LAB
GLUCOSE UR STRIP-MCNC: NEGATIVE MG/DL
PROT UR STRIP-MCNC: NEGATIVE MG/DL

## 2023-10-12 PROCEDURE — 76811 OB US DETAILED SNGL FETUS: CPT

## 2023-10-12 RX ORDER — LABETALOL 100 MG/1
TABLET, FILM COATED ORAL
COMMUNITY
End: 2023-10-12

## 2023-10-12 NOTE — PROGRESS NOTES
"Documentation of consultation visit will be available in patient's Viewpoint report.        Maternal/Fetal Medicine Consult Note     Name: Kalyani Sandhu    : 1990     MRN: [id number]     Referring Provider: Marianela Renteria MD    Chief Complaint  CHT, Hx of GDM, Prev c/s , Previous child with \" hole in th    Subjective     History of Present Illness:  Kalyani Sandhu is a 33 y.o.  19w6d who presents today for CHTN    NADINE: Estimated Date of Delivery: 3/1/24     ROS:   As noted in HPI.     Past Medical History:   Diagnosis Date    Chronic hypertension     on labetalol    Fibroid     History of gestational diabetes     Scoliosis     Urinary tract infection     frequ uti prior to preg, seen by urology    Varicella     as a child      Past Surgical History:   Procedure Laterality Date     SECTION N/A 10/22/2019    Procedure:  SECTION PRIMARY;  Surgeon: Marianela Renteria MD;  Location: [facility] LABOR DELIVERY;  Service: Obstetrics/Gynecology    WISDOM TOOTH EXTRACTION        OB History          2    Para   1    Term   1       0    AB   0    Living   1         SAB   0    IAB   0    Ectopic   0    Molar   0    Multiple   0    Live Births   1          Obstetric Comments   Fob #1 - Pregnancy #1 and #2   Pregnancy #1 - Gestational diabetes                Objective     Vital Signs  /74   Wt 98.9 kg (218 lb)   LMP 2023   Estimated body mass index is 35.19 kg/mý as calculated from the following:    Height as of 20: 167.6 cm (66\").    Weight as of this encounter: 98.9 kg (218 lb).    Physical Exam    Ultrasound Impression:   See VIewpoint    Assessment and Plan     Kalyani Sandhu is a 33 y.o.  19w6d who presents today for CHTN    Diagnoses and all orders for this visit:    1. Chronic hypertension affecting pregnancy (Primary)  Assessment & Plan:  The frequency of chronic hypertension in pregnancy is estimated at 1% to 5%.  It is more common " in older obese women and in women of -American descent.  Preexisting hypertension is a recognized risk factor for preeclampsia.  Superimposed preeclampsia develops in 13-40% of women with chronic hypertension, depending on diagnostic criteria, etiology (essential versus secondary), duration, and the severity of hypertension. A major reason for this wide range in incidence is that the definition of superimposed preeclampsia is used liberally in some studies.  Pregnancies complicated by chronic hypertension are also at an increased risk for abruption placentae with the reported rate in women with mild chronic hypertension ranging from 0.7% to 2.7% and in those with severe hypertension may be as high as 5% to 10%.  Both complications may result in significant maternal, fetal, and  morbidity and mortality.     Women with chronic hypertension who develop superimposed preeclampsia have higher rates of adverse maternal and fetal outcomes, but the independent risks associated with uncomplicated chronic hypertension are less clear.  An analysis of 1,807 deliveries in women with chronic hypertension found that uncomplicated chronic hypertension was still associated with greater risk of  delivery (odds ratio [OR], 2.7) and postpartum hemorrhage (OR, 2.2) compared with women without hypertension (David 2004). Other adverse maternal outcomes in women with chronic hypertension include accelerated hypertension with resultant target organ damage (eg, to the heart, brain, and kidneys), although in the absence of preeclampsia, this is extremely uncommon. Women with higher prepregnancy blood pressure or those with secondary hypertension are at greater risk for development of severe hypertension during pregnancy. Chronic hypertension is associated with an increased risk of gestational diabetes (OR, 1.8).  This may reflect similar risk factors for both conditions (obesity) as well as similar pathogenetic  mechanisms (insulin resistance). The risk of placental abruption is increased threefold in women with chronic hypertension, although most of the increased risk is associated with superimposed preeclampsia (Sibai 1998).       mortality is higher in pregnancies associated with chronic hypertension, most of this increased attributable risk the result of superimposed preeclampsia (Sibai 1998). The relative risk of  death is reported to be approximately 3.6 in women with superimposed preeclampsia compared with those with uncomplicated chronic hypertension (Del 1994).  death is also higher in women with uncomplicated hypertension compared with normotensive controls (relative risk 2.3) (Del 1994).     In nonpregnant individuals, long-term blood pressure control can lead to significant reductions in the rates of stroke and cardiovascular morbidity and mortality.  Based on the available data, there is no compelling evidence that short-term antihypertensive therapy is beneficial for the mother or the fetus in the setting of low-risk hypertension except for a reduction in the rate of exacerbation of hypertension. Antihypertensive therapy, however, is necessary in women with severe hypertension to reduce the acute risk for stroke, congestive heart failure, and renal failure.  In addition, control of severe hypertension may permit pregnancy prolongation and thereby improve  outcome.  There is no available evidence, however, that control of severe hypertension reduces the rate of either superimposed preeclampsia or abruptio placentae.  (Hypertension in Pregnancy Task Force.  Obstet Gynecol 2013.)      The use of atenolol during the first and second trimesters has been associated with significantly reduced fetal growth along with decreased placental growth and weight.  Combination alpha- and beta-blocking agents such as labetalol have been associated with reduced fetal growth but to a lesser  extent than that noted with pure beta-blockers such as atenolol.  The available evidence suggests that the use of calcium channel-blockers, particularly nifedipine, in the first trimester has not been associated with increased rates of major birth defects or fetal growth restriction.  Antihypertensive therapy with either nifedipine or labetalol can be initiated if the patient develops severe hypertension before term.  Fetal evaluation for patients requiring antihypertensive therapy should include an ultrasound examination at 18-20 weeks' gestation and repeated at 28 weeks' gestation then monthly thereafter until term.  The development of severe hypertension, preeclampsia or abnormal fetal growth requires immediate fetal testing with NST or BPP. Women who develop severe hypertension and those with documented fetal growth restriction by ultrasound examination require hospitalization and consideration of delivery.  If superimposed preeclampsia is diagnosed at or beyond 37 weeks' gestation, delivery is undertaken.      Orders:  -      [facility]; Future    2. Previous  section  -      [facility]; Future    3. Family history of congenital heart defect  Assessment & Plan:  Patient with previos child with a VSD that resolved spontaneously.  Will perform fetal echocardiogram in 4 weeks.    Orders:  -      [facility]; Future    4. Pregnancy, unspecified gestational age  -      [facility]; Future         Follow Up  Return in about 4 weeks (around 2023).    I spent 15 minutes caring for the patient on the day of service. This included: obtaining or reviewing a separately obtained medical history, reviewing patient records, performing a medically appropriate exam and/or evaluation, counseling or educating the patient/family/caregiver, ordering medications, labs, and/or procedures and documenting such in the medical record.  This does not include time spent on review and interpretation of other tests such as fetal ultrasound or the performance of other procedures such as amniocentesis or CVS.      Douglas A. Milligan, MD  Maternal Fetal Medicine, [facility]     10/12/2023

## 2023-10-12 NOTE — ASSESSMENT & PLAN NOTE
Patient with previos child with a VSD that resolved spontaneously.  Will perform fetal echocardiogram in 4 weeks.

## 2023-10-12 NOTE — ASSESSMENT & PLAN NOTE
The frequency of chronic hypertension in pregnancy is estimated at 1% to 5%.  It is more common in older obese women and in women of -American descent.  Preexisting hypertension is a recognized risk factor for preeclampsia.  Superimposed preeclampsia develops in 13-40% of women with chronic hypertension, depending on diagnostic criteria, etiology (essential versus secondary), duration, and the severity of hypertension. A major reason for this wide range in incidence is that the definition of superimposed preeclampsia is used liberally in some studies.  Pregnancies complicated by chronic hypertension are also at an increased risk for abruption placentae with the reported rate in women with mild chronic hypertension ranging from 0.7% to 2.7% and in those with severe hypertension may be as high as 5% to 10%.  Both complications may result in significant maternal, fetal, and  morbidity and mortality.     Women with chronic hypertension who develop superimposed preeclampsia have higher rates of adverse maternal and fetal outcomes, but the independent risks associated with uncomplicated chronic hypertension are less clear.  An analysis of 1,807 deliveries in women with chronic hypertension found that uncomplicated chronic hypertension was still associated with greater risk of  delivery (odds ratio [OR], 2.7) and postpartum hemorrhage (OR, 2.2) compared with women without hypertension (David 2004). Other adverse maternal outcomes in women with chronic hypertension include accelerated hypertension with resultant target organ damage (eg, to the heart, brain, and kidneys), although in the absence of preeclampsia, this is extremely uncommon. Women with higher prepregnancy blood pressure or those with secondary hypertension are at greater risk for development of severe hypertension during pregnancy. Chronic hypertension is associated with an increased risk of gestational diabetes (OR, 1.8).  This may  reflect similar risk factors for both conditions (obesity) as well as similar pathogenetic mechanisms (insulin resistance). The risk of placental abruption is increased threefold in women with chronic hypertension, although most of the increased risk is associated with superimposed preeclampsia (Sibai 1998).       mortality is higher in pregnancies associated with chronic hypertension, most of this increased attributable risk the result of superimposed preeclampsia (Sibai 1998). The relative risk of  death is reported to be approximately 3.6 in women with superimposed preeclampsia compared with those with uncomplicated chronic hypertension (Del 1994).  death is also higher in women with uncomplicated hypertension compared with normotensive controls (relative risk 2.3) (Del 1994).     In nonpregnant individuals, long-term blood pressure control can lead to significant reductions in the rates of stroke and cardiovascular morbidity and mortality.  Based on the available data, there is no compelling evidence that short-term antihypertensive therapy is beneficial for the mother or the fetus in the setting of low-risk hypertension except for a reduction in the rate of exacerbation of hypertension. Antihypertensive therapy, however, is necessary in women with severe hypertension to reduce the acute risk for stroke, congestive heart failure, and renal failure.  In addition, control of severe hypertension may permit pregnancy prolongation and thereby improve  outcome.  There is no available evidence, however, that control of severe hypertension reduces the rate of either superimposed preeclampsia or abruptio placentae.  (Hypertension in Pregnancy Task Force.  Obstet Gynecol 2013.)      The use of atenolol during the first and second trimesters has been associated with significantly reduced fetal growth along with decreased placental growth and weight.  Combination alpha- and beta-blocking  agents such as labetalol have been associated with reduced fetal growth but to a lesser extent than that noted with pure beta-blockers such as atenolol.  The available evidence suggests that the use of calcium channel-blockers, particularly nifedipine, in the first trimester has not been associated with increased rates of major birth defects or fetal growth restriction.  Antihypertensive therapy with either nifedipine or labetalol can be initiated if the patient develops severe hypertension before term.  Fetal evaluation for patients requiring antihypertensive therapy should include an ultrasound examination at 18-20 weeks' gestation and repeated at 28 weeks' gestation then monthly thereafter until term.  The development of severe hypertension, preeclampsia or abnormal fetal growth requires immediate fetal testing with NST or BPP. Women who develop severe hypertension and those with documented fetal growth restriction by ultrasound examination require hospitalization and consideration of delivery.  If superimposed preeclampsia is diagnosed at or beyond 37 weeks' gestation, delivery is undertaken.

## 2023-10-12 NOTE — LETTER
October 12, 2023     CHIDI Dunn  [address] [address]    Patient: Kalyani Sandhu   YOB: 1990   Date of Visit: 10/12/2023       Dear CHIDI Dunn:    Thank you for referring Kalyani Sandhu to me for evaluation. Below are the relevant portions of my assessment and plan of care.    Encounter Diagnosis and Orders:       If you have questions, please do not hesitate to call me. I look forward to following Kalyani along with you.         Sincerely,        Douglas A. Milligan, MD        CC: Marianela Renteria MD

## 2023-10-12 NOTE — PROGRESS NOTES
OB FOLLOW UP  CC- Here for care of pregnancy        Kalyani Sandhu is a 33 y.o.  19w6d patient being seen today for her obstetrical follow up visit. Patient reports one occurrence of vaginal spotting a couple of weeks ago. She denies any bleeding since.     Her prenatal care is complicated by (and status) : Chronic HTN-she has not been checking b/p at home  Patient Active Problem List   Diagnosis    Pregnancy    Previous  section    Chronic hypertension affecting pregnancy    Previous gestational diabetes mellitus, antepartum    Anxiety during pregnancy    Family history of congenital heart defect       Flu Status: Will give in office today  Ultrasound Today: Yes    ROS -   Patient Reports : Vaginal Spotting  Patient Denies: Loss of Fluid, Vision Changes, Headaches, Nausea , and Vomiting   Fetal Movement : normal  All other systems reviewed and are negative.       The additional following portions of the patient's history were reviewed and updated as appropriate: allergies, current medications, past family history, past medical history, past social history, past surgical history, and problem list.      I have reviewed and agree with the HPI, ROS, and historical information as entered above. Marianela Renteria MD      /82   Wt 99.1 kg (218 lb 6.4 oz)   LMP 2023   BMI 35.25 kg/mý       EXAM:     Prenatal Vitals  BP: 130/82  Weight: 99.1 kg (218 lb 6.4 oz)   Fetal Heart Rate: +          Urine Glucose Read-only: Negative  Urine Protein Read-only: Negative       Assessment and Plan    Problem List Items Addressed This Visit          Gynecologic and Obstetric Problems    Chronic hypertension affecting pregnancy    Overview     Labetelol 100 BID at nob, baseline PEP 24h urine 120mg  Baby asa         Relevant Medications    labetalol (NORMODYNE) 100 MG tablet    Anxiety during pregnancy    Overview     Zoloft at nob         Relevant Medications    sertraline (ZOLOFT) 50 MG tablet        Other    Pregnancy    Overview     Prev c/s 37 wks 8#2oz         Previous  section    Overview     Desires repeat c/s         Previous gestational diabetes mellitus, antepartum    Overview     Early glucola wnl         Family history of congenital heart defect     Other Visit Diagnoses       Prenatal care in second trimester    -  Primary    Relevant Orders    POC Urinalysis Dipstick (Completed)            Pregnancy at 19w6d  Anatomy scan today is  done today at [facility], some suboptimal views, but otherwise normal. Has FU sched in 4 weeks for fetal echo.   Will increase her labetelol to 200 BID.    Fetal status reassuring.   Activity and Exercise discussed.  Patient is on Prenatal vitamins  Return in about 1 month (around 2023) for F/U Prenatal.    Marianela Renteria MD  10/12/2023

## 2023-11-14 ENCOUNTER — HOSPITAL ENCOUNTER (OUTPATIENT)
Dept: WOMENS IMAGING | Facility: HOSPITAL | Age: 33
Discharge: HOME | End: 2023-11-14
Admitting: OBSTETRICS & GYNECOLOGY
Payer: COMMERCIAL

## 2023-11-14 ENCOUNTER — ROUTINE PRENATAL (OUTPATIENT)
Dept: OBSTETRICS AND GYNECOLOGY | Facility: CLINIC | Age: 33
End: 2023-11-14
Payer: COMMERCIAL

## 2023-11-14 ENCOUNTER — OFFICE VISIT (OUTPATIENT)
Dept: OBSTETRICS AND GYNECOLOGY | Facility: HOSPITAL | Age: 33
End: 2023-11-14
Payer: COMMERCIAL

## 2023-11-14 VITALS — WEIGHT: 226 LBS | DIASTOLIC BLOOD PRESSURE: 81 MMHG | SYSTOLIC BLOOD PRESSURE: 153 MMHG | BODY MASS INDEX: 36.48 KG/M2

## 2023-11-14 VITALS — WEIGHT: 225.2 LBS | DIASTOLIC BLOOD PRESSURE: 78 MMHG | SYSTOLIC BLOOD PRESSURE: 126 MMHG | BODY MASS INDEX: 36.35 KG/M2

## 2023-11-14 DIAGNOSIS — Z82.79 FAMILY HISTORY OF CONGENITAL HEART DEFECT: ICD-10-CM

## 2023-11-14 DIAGNOSIS — O10.919 CHRONIC HYPERTENSION AFFECTING PREGNANCY: ICD-10-CM

## 2023-11-14 DIAGNOSIS — Z98.891 PREVIOUS CESAREAN SECTION: ICD-10-CM

## 2023-11-14 DIAGNOSIS — Z3A.24 24 WEEKS GESTATION OF PREGNANCY: ICD-10-CM

## 2023-11-14 DIAGNOSIS — Z82.79 FAMILY HISTORY OF CONGENITAL HEART DEFECT: Primary | ICD-10-CM

## 2023-11-14 DIAGNOSIS — Z34.92 PRENATAL CARE IN SECOND TRIMESTER: Primary | ICD-10-CM

## 2023-11-14 DIAGNOSIS — O99.340 ANXIETY DURING PREGNANCY: ICD-10-CM

## 2023-11-14 DIAGNOSIS — O09.299 PREVIOUS GESTATIONAL DIABETES MELLITUS, ANTEPARTUM: ICD-10-CM

## 2023-11-14 DIAGNOSIS — Z34.90 PREGNANCY, UNSPECIFIED GESTATIONAL AGE: ICD-10-CM

## 2023-11-14 DIAGNOSIS — F41.9 ANXIETY DURING PREGNANCY: ICD-10-CM

## 2023-11-14 DIAGNOSIS — Z86.32 PREVIOUS GESTATIONAL DIABETES MELLITUS, ANTEPARTUM: ICD-10-CM

## 2023-11-14 LAB
GLUCOSE UR STRIP-MCNC: NEGATIVE MG/DL
PROT UR STRIP-MCNC: NEGATIVE MG/DL

## 2023-11-14 PROCEDURE — 93325 DOPPLER ECHO COLOR FLOW MAPG: CPT

## 2023-11-14 PROCEDURE — 76816 OB US FOLLOW-UP PER FETUS: CPT

## 2023-11-14 PROCEDURE — 76825 ECHO EXAM OF FETAL HEART: CPT

## 2023-11-14 NOTE — ASSESSMENT & PLAN NOTE
Patient returns for follow-up scan today.  Fetus appears entirely normal and normally grown fetal echocardiogram was normal as well.  Patient's blood pressure today was initially 153/80 1 repeat several minutes later was 142/77.  Patient notes that her pressures at home are generally in the 120s over 80s.  She is noticing no signs or symptoms of preeclampsia.    Patient is currently taking labetalol 200 mg twice daily.  If her pressures are significantly and consistently elevated I would recommend first going to 3 times daily dosing as labetalol is cleared more rapidly during pregnancy than nonpregnant state.  If baseline preeclampsia labs and 24-hour urine are not been performed I would recommend ordering these as well.    We will perform growth scans at 28 and 32 weeks gestation.  We would recommend  testing starting at 32 weeks gestation if no other complications arise.

## 2023-11-14 NOTE — LETTER
"2023     Marianela Renteria MD  [address] [facility]  [address]  [facility] [address]    Patient: Kalyani Sandhu   YOB: 1990   Date of Visit: 2023     Dear Marianela Renteria MD:       Thank you for referring Kalyani Sandhu to me for evaluation. Below are the relevant portions of my assessment and plan of care.    If you have questions, please do not hesitate to call me. I look forward to following Kalyani along with you.         Sincerely,        Douglas A. Milligan, MD        CC: No Recipients    Milligan, Douglas A, MD  23 [phone]  Sign when Signing Visit  Documentation of the ultrasound findings, images, and interpretations will be available in the patient's Viewpoint report which is located in the imaging tab in chart review.    Maternal/Fetal Medicine Follow Up  Note     Name: Kalyani Sandhu    : 1990     MRN: [id number]     Referring Provider: Marianela Renteria MD    Chief Complaint  CHTN    Subjective     History of Present Illness:  Kalyani Sandhu is a 33 y.o.  24w4d who presents today for CHTN    NADINE: Estimated Date of Delivery: 3/1/24     ROS:   As noted in HPI.     Objective     Vital Signs  /81   Wt 103 kg (226 lb)   LMP 2023   Estimated body mass index is 36.48 kg/m² as calculated from the following:    Height as of 20: 167.6 cm (66\").    Weight as of this encounter: 103 kg (226 lb).    Physical Exam    Ultrasound Impression:   See Viewpoint    Assessment and Plan     Kalyani Sandhu is a 33 y.o.  24w4d who presents today for CHTN    Diagnoses and all orders for this visit:    1. Family history of congenital heart defect (Primary)  Assessment & Plan:  Patient returns today for fetal echocardiogram given family history of congenital heart disease.  Father this child was born with congenital heart defect.  On ultrasound today the fetus appears entirely normally grown and no abnormalities were seen in particular fetal " echocardiogram was performed and we no abnormalities were seen.    As not all congenital heart disease can be seen in utero we would recommend informing the pediatricians about the family history but I think the risk of significant structural defects is very low.    Orders:  -     [facility]; Future    2. Chronic hypertension affecting pregnancy  Assessment & Plan:  Patient returns for follow-up scan today.  Fetus appears entirely normal and normally grown fetal echocardiogram was normal as well.  Patient's blood pressure today was initially 153/80 1 repeat several minutes later was 142/77.  Patient notes that her pressures at home are generally in the 120s over 80s.  She is noticing no signs or symptoms of preeclampsia.    Patient is currently taking labetalol 200 mg twice daily.  If her pressures are significantly and consistently elevated I would recommend first going to 3 times daily dosing as labetalol is cleared more rapidly during pregnancy than nonpregnant state.  If baseline preeclampsia labs and 24-hour urine are not been performed I would recommend ordering these as well.    We will perform growth scans at 28 and 32 weeks gestation.  We would recommend  testing starting at 32 weeks gestation if no other complications arise.    Orders:  -     [facility]; Future    3. Previous gestational diabetes mellitus, antepartum  -     [facility]; Future    4. Previous  section  -      [facility]; Future    5. 24 weeks gestation of pregnancy  -     [facility]; Future         Follow Up  Return in about 4 weeks (around 2023).    I spent 20 minutes caring for the patient on the day of service. This included: obtaining or reviewing a separately obtained medical history, reviewing patient records, performing a medically appropriate exam and/or evaluation, counseling or educating the  patient/family/caregiver, ordering medications, labs, and/or procedures and documenting such in the medical record. This does not include time spent on review and interpretation of other tests such as fetal ultrasound or the performance of other procedures such as amniocentesis or CVS.      Douglas A. Milligan, MD  Maternal Fetal Medicine, [facility]     2023

## 2023-11-14 NOTE — PROGRESS NOTES
OB FOLLOW UP  CC- Here for care of pregnancy        Kalyani Sandhu is a 33 y.o.  24w4d patient being seen today for her obstetrical follow up visit. Patient reports no complaints..     Her prenatal care is complicated by (and status) : Chronic HTN. Her average BP has been 120's-130's/80's.  Patient Active Problem List   Diagnosis    Pregnancy    Previous  section    Chronic hypertension affecting pregnancy    Previous gestational diabetes mellitus, antepartum    Anxiety during pregnancy    Family history of congenital heart defect       Flu Status: Already given in current flu season  Ultrasound Today: Yes    ROS -   Patient Reports : No Problems  Patient Denies: Loss of Fluid, Vaginal Spotting, Vision Changes, Headaches, Nausea , and Vomiting   Fetal Movement : normal  All other systems reviewed and are negative.       The additional following portions of the patient's history were reviewed and updated as appropriate: allergies, current medications, past family history, past medical history, past social history, past surgical history, and problem list.      I have reviewed and agree with the HPI, ROS, and historical information as entered above. Marianela Renteria MD      /78   Wt 102 kg (225 lb 3.2 oz)   LMP 2023   BMI 36.35 kg/m²       EXAM:     Prenatal Vitals  BP: 126/78  Weight: 102 kg (225 lb 3.2 oz)   Fetal Heart Rate: +               Urine Glucose Read-only: Negative  Urine Protein Read-only: Negative       Assessment and Plan    Problem List Items Addressed This Visit          Gynecologic and Obstetric Problems    Chronic hypertension affecting pregnancy    Overview     Labetelol 100 BID at nob, baseline PEP 24h urine 120mg  Baby asa, 200 TID at 24 wks         Relevant Medications    labetalol (NORMODYNE) 100 MG tablet    Anxiety during pregnancy    Overview     Zoloft at nob         Relevant Medications    sertraline (ZOLOFT) 50 MG tablet       Other    Pregnancy     Overview     Prev c/s 37 wks 8#2oz         Previous  section    Overview     Desires repeat c/s         Previous gestational diabetes mellitus, antepartum    Overview     Early glucola wnl         Family history of congenital heart defect    Overview     Fetal echo wnl          Other Visit Diagnoses       Prenatal care in second trimester    -  Primary    Relevant Orders    POC Urinalysis Dipstick (Completed)            Pregnancy at 24w4d. Fetal echo today at [facility] wnl. Has FU sched 28 wks for growth.   Fetal status reassuring.  [facility] scan reviewed today.  1 hour gtt, CBC, Antibody screen, and TDAP next visit. Instructions given  Discussed/encouraged TDAP vaccination after 28 weeks  Activity and Exercise discussed.  Return in about 1 month (around 2023) for F/U Prenatal, and glucola.    Marianela Renteria MD  2023

## 2023-11-14 NOTE — PROGRESS NOTES
"Documentation of the ultrasound findings, images, and interpretations will be available in the patient's Viewpoint report which is located in the imaging tab in chart review.    Maternal/Fetal Medicine Follow Up  Note     Name: Kalyani Sandhu    : 1990     MRN: [id number]     Referring Provider: Marianela Renteria MD    Chief Complaint  CHTN    Subjective     History of Present Illness:  Kalyani Sandhu is a 33 y.o.  24w4d who presents today for CHTN    NADINE: Estimated Date of Delivery: 3/1/24     ROS:   As noted in HPI.     Objective     Vital Signs  /81   Wt 103 kg (226 lb)   LMP 2023   Estimated body mass index is 36.48 kg/m² as calculated from the following:    Height as of 20: 167.6 cm (66\").    Weight as of this encounter: 103 kg (226 lb).    Physical Exam    Ultrasound Impression:   See Viewpoint    Assessment and Plan     Kalyani Sandhu is a 33 y.o.  24w4d who presents today for CHTN    Diagnoses and all orders for this visit:    1. Family history of congenital heart defect (Primary)  Assessment & Plan:  Patient returns today for fetal echocardiogram given family history of congenital heart disease.  Father this child was born with congenital heart defect.  On ultrasound today the fetus appears entirely normally grown and no abnormalities were seen in particular fetal echocardiogram was performed and we no abnormalities were seen.    As not all congenital heart disease can be seen in utero we would recommend informing the pediatricians about the family history but I think the risk of significant structural defects is very low.    Orders:  -     [facility]; Future    2. Chronic hypertension affecting pregnancy  Assessment & Plan:  Patient returns for follow-up scan today.  Fetus appears entirely normal and normally grown fetal echocardiogram was normal as well.  Patient's blood pressure today was initially 153/80 1 repeat several " minutes later was 142/77.  Patient notes that her pressures at home are generally in the 120s over 80s.  She is noticing no signs or symptoms of preeclampsia.    Patient is currently taking labetalol 200 mg twice daily.  If her pressures are significantly and consistently elevated I would recommend first going to 3 times daily dosing as labetalol is cleared more rapidly during pregnancy than nonpregnant state.  If baseline preeclampsia labs and 24-hour urine are not been performed I would recommend ordering these as well.    We will perform growth scans at 28 and 32 weeks gestation.  We would recommend  testing starting at 32 weeks gestation if no other complications arise.    Orders:  -     [facility]; Future    3. Previous gestational diabetes mellitus, antepartum  -     [facility]; Future    4. Previous  section  -     [facility]; Future    5. 24 weeks gestation of pregnancy  -     [facility]; Future         Follow Up  Return in about 4 weeks (around 2023).    I spent 20 minutes caring for the patient on the day of service. This included: obtaining or reviewing a separately obtained medical history, reviewing patient records, performing a medically appropriate exam and/or evaluation, counseling or educating the patient/family/caregiver, ordering medications, labs, and/or procedures and documenting such in the medical record. This does not include time spent on review and interpretation of other tests such as fetal ultrasound or the performance of other procedures such as amniocentesis or CVS.      Douglas A. Milligan, MD  Maternal Fetal Medicine, [facility]     2023

## 2023-11-14 NOTE — ASSESSMENT & PLAN NOTE
Patient returns today for fetal echocardiogram given family history of congenital heart disease.  Father this child was born with congenital heart defect.  On ultrasound today the fetus appears entirely normally grown and no abnormalities were seen in particular fetal echocardiogram was performed and we no abnormalities were seen.    As not all congenital heart disease can be seen in utero we would recommend informing the pediatricians about the family history but I think the risk of significant structural defects is very low.

## 2023-11-14 NOTE — PROGRESS NOTES
Patient denies bleeding, leaking fluid or contractions  NIPT declined  Patients next follow up with Dr. Renteria is today  Patient is currently on Labetalol 200 mg BID  Patient denies headaches, vision changes or upper epigastric pain.  Patient just voided and is unable to void again at this time.

## 2023-12-13 ENCOUNTER — HOSPITAL ENCOUNTER (OUTPATIENT)
Dept: WOMENS IMAGING | Facility: HOSPITAL | Age: 33
Discharge: HOME | End: 2023-12-13
Admitting: OBSTETRICS & GYNECOLOGY
Payer: COMMERCIAL

## 2023-12-13 ENCOUNTER — OFFICE VISIT (OUTPATIENT)
Dept: OBSTETRICS AND GYNECOLOGY | Facility: HOSPITAL | Age: 33
End: 2023-12-13
Payer: COMMERCIAL

## 2023-12-13 ENCOUNTER — ROUTINE PRENATAL (OUTPATIENT)
Dept: OBSTETRICS AND GYNECOLOGY | Facility: CLINIC | Age: 33
End: 2023-12-13
Payer: COMMERCIAL

## 2023-12-13 VITALS — WEIGHT: 228 LBS | DIASTOLIC BLOOD PRESSURE: 84 MMHG | BODY MASS INDEX: 36.8 KG/M2 | SYSTOLIC BLOOD PRESSURE: 130 MMHG

## 2023-12-13 VITALS — SYSTOLIC BLOOD PRESSURE: 139 MMHG | BODY MASS INDEX: 36.8 KG/M2 | DIASTOLIC BLOOD PRESSURE: 68 MMHG | WEIGHT: 228 LBS

## 2023-12-13 DIAGNOSIS — O09.299 PREVIOUS GESTATIONAL DIABETES MELLITUS, ANTEPARTUM: ICD-10-CM

## 2023-12-13 DIAGNOSIS — Z86.32 PREVIOUS GESTATIONAL DIABETES MELLITUS, ANTEPARTUM: ICD-10-CM

## 2023-12-13 DIAGNOSIS — Z34.90 PRENATAL CARE, ANTEPARTUM: Primary | ICD-10-CM

## 2023-12-13 DIAGNOSIS — O10.919 CHRONIC HYPERTENSION AFFECTING PREGNANCY: Primary | ICD-10-CM

## 2023-12-13 DIAGNOSIS — Z82.79 FAMILY HISTORY OF CONGENITAL HEART DEFECT: ICD-10-CM

## 2023-12-13 DIAGNOSIS — O99.340 ANXIETY DURING PREGNANCY: ICD-10-CM

## 2023-12-13 DIAGNOSIS — F41.9 ANXIETY DURING PREGNANCY: ICD-10-CM

## 2023-12-13 DIAGNOSIS — Z34.90 PREGNANCY, UNSPECIFIED GESTATIONAL AGE: ICD-10-CM

## 2023-12-13 DIAGNOSIS — Z3A.24 24 WEEKS GESTATION OF PREGNANCY: ICD-10-CM

## 2023-12-13 DIAGNOSIS — Z3A.28 28 WEEKS GESTATION OF PREGNANCY: ICD-10-CM

## 2023-12-13 DIAGNOSIS — O10.919 CHRONIC HYPERTENSION AFFECTING PREGNANCY: ICD-10-CM

## 2023-12-13 DIAGNOSIS — Z98.891 PREVIOUS CESAREAN SECTION: ICD-10-CM

## 2023-12-13 LAB
ERYTHROCYTE [DISTWIDTH] IN BLOOD BY AUTOMATED COUNT: 13.7 % (ref 12.3–15.4)
GLUCOSE 1H P 50 G GLC PO SERPL-MCNC: 172 MG/DL (ref 65–139)
GLUCOSE UR STRIP-MCNC: NEGATIVE MG/DL
HCT VFR BLD AUTO: 32.5 % (ref 34–46.6)
HGB BLD-MCNC: 10.4 G/DL (ref 12–15.9)
MCH RBC QN AUTO: 29.5 PG (ref 26.6–33)
MCHC RBC AUTO-ENTMCNC: 32 G/DL (ref 31.5–35.7)
MCV RBC AUTO: 92.1 FL (ref 79–97)
PLATELET # BLD AUTO: 160 10*3/MM3 (ref 140–450)
PROT UR STRIP-MCNC: NEGATIVE MG/DL
RBC # BLD AUTO: 3.53 10*6/MM3 (ref 3.77–5.28)
WBC # BLD AUTO: 6.19 10*3/MM3 (ref 3.4–10.8)

## 2023-12-13 PROCEDURE — 76816 OB US FOLLOW-UP PER FETUS: CPT

## 2023-12-13 NOTE — ASSESSMENT & PLAN NOTE
Patient returns today for follow-up of chronic hypertension today.  Blood pressure today is 139/68.  Patient reports that she is checking her blood pressures consistently at home and her blood pressures are running 120s to 130s over 60s to 80s.  Patient was counseled that if she were to notice consistently elevated blood pressures over 140/90, or severe level pressures 160/110 she needs to contact her physician immediately.  Patient was counseled extensively regarding fetal movement and will contact her provider immediately if she notices decreased fetal movement.    We will rescan the patient again for growth in 4 weeks.  We do recommend  testing starting at 32 weeks gestation for chronic hypertension on medication.

## 2023-12-13 NOTE — PROGRESS NOTES
"Documentation of the ultrasound findings, images, and interpretations will be available in the patient's Viewpoint report which is located in the imaging tab in chart review.    Maternal/Fetal Medicine Follow Up  Note     Name: Kalyani Sandhu    : 1990     MRN: [id number]     Referring Provider: Marianela Renteria MD    Chief Complaint  Prev child with CHD, CHTN, Prev c/s x 1, Obesity     Subjective     History of Present Illness:  Kalyani Sandhu is a 33 y.o.  28w5d who presents today for CHTn    NADINE: Estimated Date of Delivery: 3/1/24     ROS:   As noted in HPI.     Objective     Vital Signs  /68   Wt 103 kg (228 lb)   LMP 2023   Estimated body mass index is 36.8 kg/m² as calculated from the following:    Height as of 20: 167.6 cm (66\").    Weight as of this encounter: 103 kg (228 lb).    Physical Exam    Ultrasound Impression:   See Viewpoint    Assessment and Plan     Kalyani Sandhu is a 33 y.o.  28w5d who presents today for CHTN     Diagnoses and all orders for this visit:    1. Chronic hypertension affecting pregnancy (Primary)  Assessment & Plan:  Patient returns today for follow-up of chronic hypertension today.  Blood pressure today is 139/68.  Patient reports that she is checking her blood pressures consistently at home and her blood pressures are running 120s to 130s over 60s to 80s.  Patient was counseled that if she were to notice consistently elevated blood pressures over 140/90, or severe level pressures 160/110 she needs to contact her physician immediately.  Patient was counseled extensively regarding fetal movement and will contact her provider immediately if she notices decreased fetal movement.    We will rescan the patient again for growth in 4 weeks.  We do recommend  testing starting at 32 weeks gestation for chronic hypertension on medication.      2. Previous  section    3. Previous gestational diabetes mellitus, " antepartum    4. Family history of congenital heart defect    5. Pregnancy, unspecified gestational age         Follow Up  Return in about 4 weeks (around 1/10/2024).    I spent 10 minutes caring for the patient on the day of service. This included: obtaining or reviewing a separately obtained medical history, reviewing patient records, performing a medically appropriate exam and/or evaluation, counseling or educating the patient/family/caregiver, ordering medications, labs, and/or procedures and documenting such in the medical record. This does not include time spent on review and interpretation of other tests such as fetal ultrasound or the performance of other procedures such as amniocentesis or CVS.      Douglas A. Milligan, MD  Maternal Fetal Medicine, [facility]    [facility] Center     2023

## 2023-12-13 NOTE — PROGRESS NOTES
OB FOLLOW UP  CC- Here for care of pregnancy        Kalyani Sandhu is a 33 y.o.  28w5d patient being seen today for her obstetrical follow up. Patient reports occasional BH contractions and fatigue.     Patient undergoing Glucola testing today. She is due for her testing at 0940.       MBT: A+  Rhogam:  not indicated   28 week packet: reviewed with patient , counseled on fetal movement , pediatrician list reviewed, breast pump discussed, and childbirth classes reviewed  TDAP: would like to receive, out of stock today  Flu Status: Already given in current flu season  Ultrasound Today: Yes at [facility], follow up in 4 weeks    Her prenatal care is complicated by (and status) :  Chronic HTN. Her average BP has been 120-130/80. Takes Labetalol 100mg BID  Patient Active Problem List   Diagnosis    Pregnancy    Previous  section    Chronic hypertension affecting pregnancy    Previous gestational diabetes mellitus, antepartum    Anxiety during pregnancy    Family history of congenital heart defect         ROS -   Patient Reports : Contractions  Patient Denies: Loss of Fluid, Vaginal Spotting, Vision Changes, Headaches, Nausea , Vomiting , and Epigastric pain  Fetal Movement : normal    The additional following portions of the patient's history were reviewed and updated as appropriate: allergies, current medications, past family history, past medical history, past social history, past surgical history, and problem list.    I have reviewed and agree with the HPI, ROS, and historical information as entered above. Gale Rhoades, APRN      /84   Wt 103 kg (228 lb)   LMP 2023   BMI 36.80 kg/m²         EXAM:     Prenatal Vitals  BP: 130/84  Weight: 103 kg (228 lb)   Fetal Heart Rate: 124           Urine Glucose Read-only: Negative  Urine Protein Read-only: Negative       Assessment and Plan    Problem List Items Addressed This Visit       Anxiety during pregnancy    Overview     Zoloft at  nob         Relevant Medications    sertraline (ZOLOFT) 50 MG tablet    Chronic hypertension affecting pregnancy    Overview     Labetelol 100 BID at nob, baseline PEP 24h urine 120mg  Baby asa, 200 TID at 24 wks  23 [facility] Scan- good  2023 F/U [facility]         Relevant Medications    labetalol (NORMODYNE) 100 MG tablet    Family history of congenital heart defect    Overview     Fetal echo wnl         Pregnancy    Overview     Prev c/s 37 wks 8#2oz         Relevant Orders    POC Urinalysis Dipstick (Completed)    CBC (No Diff)    Gestational Screen 1 Hr ([facility])    Antibody Screen    Previous  section    Overview     Desires repeat c/s         Previous gestational diabetes mellitus, antepartum    Overview     Early glucola wnl          Other Visit Diagnoses       Prenatal care, antepartum    -  Primary    Relevant Orders    POC Urinalysis Dipstick (Completed)    CBC (No Diff)    Gestational Screen 1 Hr ([facility])    Antibody Screen            Pregnancy at 28w5d  1 hr Glucola, CBC, and antibody screen today  and TDAP next visit  Fetal movement/PTL or Labor precautions  Patient is on Prenatal vitamins  Discussed/encouraged TDAP vaccination after 28 weeks- Next visit  Discussed/encouraged social distancing/COVID19 precautions; encouraged vaccination when able  Reviewed Pre-eclampsia signs/symptoms.  Continue bASA for PIH prevention from 12 to 36wk  Activity and Exercise discussed.  Return in about 29 days (around 2024) for Myra Multani, Post [facility] appt.        Gale Rhoades, APRN  2023

## 2023-12-14 LAB — BLD GP AB SCN SERPL QL: NEGATIVE

## 2023-12-20 ENCOUNTER — LAB (OUTPATIENT)
Dept: OBSTETRICS AND GYNECOLOGY | Facility: CLINIC | Age: 33
End: 2023-12-20
Payer: COMMERCIAL

## 2023-12-20 DIAGNOSIS — Z3A.28 28 WEEKS GESTATION OF PREGNANCY: Primary | ICD-10-CM

## 2023-12-21 LAB
GLUCOSE 1H P 100 G GLC PO SERPL-MCNC: 180 MG/DL (ref 65–179)
GLUCOSE 2H P 100 G GLC PO SERPL-MCNC: 145 MG/DL (ref 65–154)
GLUCOSE 3H P 100 G GLC PO SERPL-MCNC: 61 MG/DL (ref 65–139)
GLUCOSE P FAST SERPL-MCNC: 81 MG/DL (ref 65–94)

## 2024-01-11 ENCOUNTER — HOSPITAL ENCOUNTER (OUTPATIENT)
Dept: WOMENS IMAGING | Facility: HOSPITAL | Age: 34
Discharge: HOME | End: 2024-01-11
Admitting: OBSTETRICS & GYNECOLOGY
Payer: COMMERCIAL

## 2024-01-11 ENCOUNTER — ROUTINE PRENATAL (OUTPATIENT)
Dept: OBSTETRICS AND GYNECOLOGY | Facility: CLINIC | Age: 34
End: 2024-01-11
Payer: COMMERCIAL

## 2024-01-11 ENCOUNTER — OFFICE VISIT (OUTPATIENT)
Dept: OBSTETRICS AND GYNECOLOGY | Facility: HOSPITAL | Age: 34
End: 2024-01-11
Payer: COMMERCIAL

## 2024-01-11 VITALS — WEIGHT: 230.8 LBS | SYSTOLIC BLOOD PRESSURE: 124 MMHG | DIASTOLIC BLOOD PRESSURE: 82 MMHG | BODY MASS INDEX: 37.25 KG/M2

## 2024-01-11 VITALS — DIASTOLIC BLOOD PRESSURE: 64 MMHG | BODY MASS INDEX: 37.28 KG/M2 | WEIGHT: 231 LBS | SYSTOLIC BLOOD PRESSURE: 126 MMHG

## 2024-01-11 DIAGNOSIS — O99.340 ANXIETY DURING PREGNANCY: ICD-10-CM

## 2024-01-11 DIAGNOSIS — O10.919 CHRONIC HYPERTENSION AFFECTING PREGNANCY: Primary | ICD-10-CM

## 2024-01-11 DIAGNOSIS — Z34.90 PRENATAL CARE, ANTEPARTUM: Primary | ICD-10-CM

## 2024-01-11 DIAGNOSIS — O10.919 CHRONIC HYPERTENSION AFFECTING PREGNANCY: ICD-10-CM

## 2024-01-11 DIAGNOSIS — Z34.90 PREGNANCY, UNSPECIFIED GESTATIONAL AGE: ICD-10-CM

## 2024-01-11 DIAGNOSIS — Z98.891 PREVIOUS CESAREAN SECTION: ICD-10-CM

## 2024-01-11 DIAGNOSIS — Z3A.32 32 WEEKS GESTATION OF PREGNANCY: ICD-10-CM

## 2024-01-11 DIAGNOSIS — Z82.79 FAMILY HISTORY OF CONGENITAL HEART DEFECT: ICD-10-CM

## 2024-01-11 DIAGNOSIS — Z86.32 PREVIOUS GESTATIONAL DIABETES MELLITUS, ANTEPARTUM: ICD-10-CM

## 2024-01-11 DIAGNOSIS — O09.299 PREVIOUS GESTATIONAL DIABETES MELLITUS, ANTEPARTUM: ICD-10-CM

## 2024-01-11 DIAGNOSIS — F41.9 ANXIETY DURING PREGNANCY: ICD-10-CM

## 2024-01-11 LAB
GLUCOSE UR STRIP-MCNC: NEGATIVE MG/DL
PROT UR STRIP-MCNC: NEGATIVE MG/DL

## 2024-01-11 PROCEDURE — 76819 FETAL BIOPHYS PROFIL W/O NST: CPT

## 2024-01-11 PROCEDURE — 76816 OB US FOLLOW-UP PER FETUS: CPT

## 2024-01-11 RX ORDER — ASPIRIN 81 MG/1
81 TABLET, CHEWABLE ORAL DAILY
COMMUNITY

## 2024-01-11 NOTE — PROGRESS NOTES
OB FOLLOW UP  CC- Here for care of pregnancy        Kalyani Sandhu is a 33 y.o.  32w6d patient being seen today for her obstetrical follow up visit. Patient reports irregular Ballard Alexandra  contractions at nighttime.     Her prenatal care is complicated by (and status) :  Chronic HTN. Her average BP has been 120-130/80-88.  Patient Active Problem List   Diagnosis    Pregnancy    Previous  section    Chronic hypertension affecting pregnancy    Previous gestational diabetes mellitus, antepartum    Anxiety during pregnancy    Family history of congenital heart defect       Flu Status: Already given in current flu season  TDAP status: given today  Rhogam status: was not indicated  28 week labs: Reviewed and Labs show anemia. She is taking additional iron supplement. Failed 1hr, passed 3hr glucola.  Ultrasound Today: Yes at [facility]  Non Stress Test: No.      ROS -   Patient Reports : Contractions  Patient Denies: Loss of Fluid, Vaginal Spotting, Vision Changes, Headaches, Nausea , Vomiting , and Epigastric pain  Fetal Movement : normal  All other systems reviewed and are negative.       The additional following portions of the patient's history were reviewed and updated as appropriate: allergies, current medications, past family history, past medical history, past social history, past surgical history, and problem list.    I have reviewed and agree with the HPI, ROS, and historical information as entered above. Marianela Renteria MD      /82   Wt 105 kg (230 lb 12.8 oz)   LMP 2023   BMI 37.25 kg/m²         EXAM:     Prenatal Vitals  BP: 124/82  Weight: 105 kg (230 lb 12.8 oz)   Fetal Heart Rate: +               Urine Glucose Read-only: Negative  Urine Protein Read-only: Negative           Assessment and Plan    Problem List Items Addressed This Visit          Gynecologic and Obstetric Problems    Chronic hypertension affecting pregnancy    Overview     Labetelol 100 BID at nob, baseline  PEP 24h urine 120mg  Baby asa, 200 TID at 24 wks           Relevant Medications    labetalol (NORMODYNE) 100 MG tablet    Other Relevant Orders    Fetal Nonstress Test    Anxiety during pregnancy    Overview     Zoloft at nob         Relevant Medications    sertraline (ZOLOFT) 50 MG tablet       Other    Pregnancy    Overview     Prev c/s 37 wks 8#2oz  EFW 64%ile 32 wks         Previous  section    Overview     Desires repeat c/s         Previous gestational diabetes mellitus, antepartum    Overview     Early glucola wnl         Family history of congenital heart defect    Overview     Fetal echo wnl          Other Visit Diagnoses       Prenatal care, antepartum    -  Primary    Relevant Orders    POC Urinalysis Dipstick (Completed)            Pregnancy at 32w6d. [facility] US today normal growth and fluid. BPP 8/8.  BP stable on labetelol. Start twice weekly nsts.   Sched repeat c/s  Iron for anemia.   Fetal status reassuring.  28 week labs reviewed.    Activity and Exercise discussed.  Fetal movement/PTL or Labor precautions  Return in about 4 days (around 1/15/2024) for F/U Prenatal, NST Next Visit.    Marianela Renteria MD  2024

## 2024-01-11 NOTE — ASSESSMENT & PLAN NOTE
Patient returns today for follow-up for chronic hypertension in pregnancy.  Ultrasound today demonstrates a normally grown fetus with no abnormality seen.  In particular fetal heart appeared entirely normal.  Amniotic fluid volume and umbilical artery Dopplers were normal.  BPP was 8 out of 8 today.    Patient blood pressure appears to be well-controlled on a low-dose of labetalol.  We would not recommend any alterations in therapy at this time.  If her blood pressure increases we would consider this and evidence of gestational hypertension.  If her medication needs to be increased the first thing we would do would be to increase to 3 times daily dosing as increase clearance during pregnancy often necessitates 3 times daily dosing of labetalol.    We would recommend twice-weekly  testing now until delivery for chronic hypertension on medication.  Patient was counseled extensively regarding fetal movement and will contact her provider immediately if she notices any decreased fetal movement.

## 2024-01-11 NOTE — PROGRESS NOTES
"Documentation of the ultrasound findings, images, and interpretations will be available in the patient's Viewpoint report which is located in the imaging tab in chart review.    Maternal/Fetal Medicine Follow Up  Note     Name: Kalyani Sandhu    : 1990     MRN: [id number]     Referring Provider: Marianela Renteria MD    Chief Complaint  CHTN, Previous child with CHD, Prev c/s     Subjective     History of Present Illness:  Kalyani Sandhu is a 33 y.o.  32w6d who presents today for CHTN    NADINE: Estimated Date of Delivery: 3/1/24     ROS:   As noted in HPI.     Objective     Vital Signs  /64   Wt 105 kg (231 lb)   LMP 2023   Estimated body mass index is 37.28 kg/m² as calculated from the following:    Height as of 20: 167.6 cm (66\").    Weight as of this encounter: 105 kg (231 lb).    Physical Exam    Ultrasound Impression:   See Viewpoint    Assessment and Plan     Kalyani Sandhu is a 33 y.o.  32w6d who presents today for CHTN    Diagnoses and all orders for this visit:    1. Chronic hypertension affecting pregnancy (Primary)  Assessment & Plan:  Patient returns today for follow-up for chronic hypertension in pregnancy.  Ultrasound today demonstrates a normally grown fetus with no abnormality seen.  In particular fetal heart appeared entirely normal.  Amniotic fluid volume and umbilical artery Dopplers were normal.  BPP was 8 out of 8 today.    Patient blood pressure appears to be well-controlled on a low-dose of labetalol.  We would not recommend any alterations in therapy at this time.  If her blood pressure increases we would consider this and evidence of gestational hypertension.  If her medication needs to be increased the first thing we would do would be to increase to 3 times daily dosing as increase clearance during pregnancy often necessitates 3 times daily dosing of labetalol.    We would recommend twice-weekly  testing now until delivery for " chronic hypertension on medication.  Patient was counseled extensively regarding fetal movement and will contact her provider immediately if she notices any decreased fetal movement.      2. Previous  section    3. Family history of congenital heart defect    4. Pregnancy, unspecified gestational age         Follow Up  No follow-ups on file.    I spent 10 minutes caring for the patient on the day of service. This included: obtaining or reviewing a separately obtained medical history, reviewing patient records, performing a medically appropriate exam and/or evaluation, counseling or educating the patient/family/caregiver, ordering medications, labs, and/or procedures and documenting such in the medical record. This does not include time spent on review and interpretation of other tests such as fetal ultrasound or the performance of other procedures such as amniocentesis or CVS.      Douglas A. Milligan, MD  Maternal Fetal Medicine, [facility]     2024

## 2024-01-11 NOTE — LETTER
"2024     Marianela Renteria MD  [address]  [address]  [facility] [address]    Patient: Kalyani Sandhu   YOB: 1990   Date of Visit: 2024     Dear Marianela Renteria MD:       Thank you for referring Kalyani Sandhu to me for evaluation. Below are the relevant portions of my assessment and plan of care.    If you have questions, please do not hesitate to call me. I look forward to following Kalyani along with you.         Sincerely,        Douglas A. Milligan, MD        CC: No Recipients    Milligan, Douglas A, MD  [id number] [phone]  Sign when Signing Visit  Documentation of the ultrasound findings, images, and interpretations will be available in the patient's Viewpoint report which is located in the imaging tab in chart review.    Maternal/Fetal Medicine Follow Up  Note     Name: Kalyani Sandhu    : 1990     MRN: [id number]     Referring Provider: Marianela Renteria MD    Chief Complaint  CHTN, Previous child with CHD, Prev c/s     Subjective     History of Present Illness:  Kalyani Sandhu is a 33 y.o.  32w6d who presents today for CHTN    NADINE: Estimated Date of Delivery: 3/1/24     ROS:   As noted in HPI.     Objective     Vital Signs  /64   Wt 105 kg (231 lb)   LMP 2023   Estimated body mass index is 37.28 kg/m² as calculated from the following:    Height as of 20: 167.6 cm (66\").    Weight as of this encounter: 105 kg (231 lb).    Physical Exam    Ultrasound Impression:   See Viewpoint    Assessment and Plan     Kalyani Sandhu is a 33 y.o.  32w6d who presents today for CHTN    Diagnoses and all orders for this visit:    1. Chronic hypertension affecting pregnancy (Primary)  Assessment & Plan:  Patient returns today for follow-up for chronic hypertension in pregnancy.  Ultrasound today demonstrates a normally grown fetus with no abnormality seen.  In particular fetal heart appeared entirely normal.  Amniotic fluid volume and " umbilical artery Dopplers were normal.  BPP was 8 out of 8 today.    Patient blood pressure appears to be well-controlled on a low-dose of labetalol.  We would not recommend any alterations in therapy at this time.  If her blood pressure increases we would consider this and evidence of gestational hypertension.  If her medication needs to be increased the first thing we would do would be to increase to 3 times daily dosing as increase clearance during pregnancy often necessitates 3 times daily dosing of labetalol.    We would recommend twice-weekly  testing now until delivery for chronic hypertension on medication.  Patient was counseled extensively regarding fetal movement and will contact her provider immediately if she notices any decreased fetal movement.      2. Previous  section    3. Family history of congenital heart defect    4. Pregnancy, unspecified gestational age         Follow Up  No follow-ups on file.    I spent 10 minutes caring for the patient on the day of service. This included: obtaining or reviewing a separately obtained medical history, reviewing patient records, performing a medically appropriate exam and/or evaluation, counseling or educating the patient/family/caregiver, ordering medications, labs, and/or procedures and documenting such in the medical record. This does not include time spent on review and interpretation of other tests such as fetal ultrasound or the performance of other procedures such as amniocentesis or CVS.      Douglas A. Milligan, MD  Maternal Fetal Medicine, [facility]     2024

## 2024-01-15 ENCOUNTER — ROUTINE PRENATAL (OUTPATIENT)
Dept: OBSTETRICS AND GYNECOLOGY | Facility: CLINIC | Age: 34
End: 2024-01-15
Payer: COMMERCIAL

## 2024-01-15 VITALS — WEIGHT: 231.4 LBS | BODY MASS INDEX: 37.35 KG/M2 | DIASTOLIC BLOOD PRESSURE: 88 MMHG | SYSTOLIC BLOOD PRESSURE: 132 MMHG

## 2024-01-15 DIAGNOSIS — Z98.891 PREVIOUS CESAREAN SECTION: ICD-10-CM

## 2024-01-15 DIAGNOSIS — O99.340 ANXIETY DURING PREGNANCY: ICD-10-CM

## 2024-01-15 DIAGNOSIS — Z82.79 FAMILY HISTORY OF CONGENITAL HEART DEFECT: ICD-10-CM

## 2024-01-15 DIAGNOSIS — F41.9 ANXIETY DURING PREGNANCY: ICD-10-CM

## 2024-01-15 DIAGNOSIS — Z3A.33 33 WEEKS GESTATION OF PREGNANCY: ICD-10-CM

## 2024-01-15 DIAGNOSIS — Z34.83 PRENATAL CARE, SUBSEQUENT PREGNANCY, THIRD TRIMESTER: Primary | ICD-10-CM

## 2024-01-15 DIAGNOSIS — O10.919 CHRONIC HYPERTENSION AFFECTING PREGNANCY: ICD-10-CM

## 2024-01-15 DIAGNOSIS — O09.299 PREVIOUS GESTATIONAL DIABETES MELLITUS, ANTEPARTUM: ICD-10-CM

## 2024-01-15 DIAGNOSIS — Z86.32 PREVIOUS GESTATIONAL DIABETES MELLITUS, ANTEPARTUM: ICD-10-CM

## 2024-01-15 LAB
GLUCOSE UR STRIP-MCNC: NEGATIVE MG/DL
PROT UR STRIP-MCNC: NEGATIVE MG/DL

## 2024-01-15 PROCEDURE — 0502F SUBSEQUENT PRENATAL CARE: CPT | Performed by: NURSE PRACTITIONER

## 2024-01-15 PROCEDURE — 59025 FETAL NON-STRESS TEST: CPT | Performed by: NURSE PRACTITIONER

## 2024-01-15 NOTE — PROGRESS NOTES
OB FOLLOW UP  CC- Here for care of pregnancy        Kalyani Sandhu is a 33 y.o.  33w3d patient being seen today for her obstetrical follow up visit. Patient reports acid reflux (TUMS resolves), nausea, and bh-ctx.     Her prenatal care is complicated by (and status) : CHTN Patient reports BP has been averaging 120-130/80's  Patient Active Problem List   Diagnosis    Pregnancy    Previous  section    Chronic hypertension affecting pregnancy    Previous gestational diabetes mellitus, antepartum    Anxiety during pregnancy    Family history of congenital heart defect       Flu Status: Already given in current flu season  Ultrasound Today: No  Non Stress Test: Yes minutes > 20  non-stress test:  equivocal   BPP   indication: Hypertension    ROS -   Patient Denies: Loss of Fluid, Vaginal Spotting, Vision Changes, Headaches, Vomiting , and Epigastric pain  Fetal Movement : normal  All other systems reviewed and are negative.       The additional following portions of the patient's history were reviewed and updated as appropriate: allergies and current medications.    I have reviewed and agree with the HPI, ROS, and historical information as entered above. Tom Bauer, APRN      /88   Wt 105 kg (231 lb 6.4 oz)   LMP 2023   BMI 37.35 kg/m²       EXAM:     Prenatal Vitals  BP: 132/88  Weight: 105 kg (231 lb 6.4 oz)   Fetal Heart Rate: NST               Urine Glucose Read-only: Negative  Urine Protein Read-only: Negative           Assessment and Plan    Problem List Items Addressed This Visit       Pregnancy    Overview     Prev c/s 37 wks 8#2oz  EFW 64%ile 32 wks         Previous  section    Overview     Desires repeat c/s         Chronic hypertension affecting pregnancy    Overview     Labetelol 100 BID at nob, baseline PEP 24h urine 120mg  Baby asa, 200 TID at 24 wks           Relevant Medications    labetalol (NORMODYNE) 100 MG tablet    Other Relevant Orders     US Fetal Biophysical Profile;With Non-Stress Testing    Previous gestational diabetes mellitus, antepartum    Overview     Early glucola wnl         Relevant Orders    US Fetal Biophysical Profile;With Non-Stress Testing    Anxiety during pregnancy    Overview     Zoloft at nob         Relevant Medications    sertraline (ZOLOFT) 50 MG tablet    Family history of congenital heart defect    Overview     Fetal echo wnl          Other Visit Diagnoses       Prenatal care, subsequent pregnancy, third trimester    -  Primary    Relevant Orders    POC Urinalysis Dipstick (Completed)            Pregnancy at 33w3d  Fetal status reassuring. NST equivocal. BPP 6/8 with points off for breathing movements.   Activity and Exercise discussed.  Fetal movement/PTL or Labor precautions  Reviewed Pre-eclampsia signs/symptoms  Discussed bASA for PIH prevention from 12 to 36wk  Return in about 3 days (around 1/18/2024) for NST.    Tom Bauer, APRN  01/15/2024

## 2024-01-19 ENCOUNTER — ROUTINE PRENATAL (OUTPATIENT)
Dept: OBSTETRICS AND GYNECOLOGY | Facility: CLINIC | Age: 34
End: 2024-01-19
Payer: COMMERCIAL

## 2024-01-19 VITALS — SYSTOLIC BLOOD PRESSURE: 122 MMHG | BODY MASS INDEX: 37.41 KG/M2 | WEIGHT: 231.8 LBS | DIASTOLIC BLOOD PRESSURE: 82 MMHG

## 2024-01-19 DIAGNOSIS — F41.9 ANXIETY DURING PREGNANCY: ICD-10-CM

## 2024-01-19 DIAGNOSIS — Z98.891 PREVIOUS CESAREAN SECTION: ICD-10-CM

## 2024-01-19 DIAGNOSIS — O10.919 CHRONIC HYPERTENSION AFFECTING PREGNANCY: ICD-10-CM

## 2024-01-19 DIAGNOSIS — Z34.93 PRENATAL CARE IN THIRD TRIMESTER: Primary | ICD-10-CM

## 2024-01-19 DIAGNOSIS — O99.340 ANXIETY DURING PREGNANCY: ICD-10-CM

## 2024-01-19 DIAGNOSIS — I10 CHRONIC HYPERTENSION: ICD-10-CM

## 2024-01-19 DIAGNOSIS — Z82.79 FAMILY HISTORY OF CONGENITAL HEART DEFECT: ICD-10-CM

## 2024-01-19 DIAGNOSIS — Z3A.34 34 WEEKS GESTATION OF PREGNANCY: ICD-10-CM

## 2024-01-19 LAB
GLUCOSE UR STRIP-MCNC: NEGATIVE MG/DL
PROT UR STRIP-MCNC: NEGATIVE MG/DL

## 2024-01-19 NOTE — PROGRESS NOTES
OB FOLLOW UP  CC- Here for care of pregnancy        Kalyani Sandhu is a 33 y.o.  34w0d patient being seen today for her obstetrical follow up visit. Patient reports occasional rossi williamson.     Her prenatal care is complicated by (and status) : Chronic HTN. Her average BP has been 120's-130's/80's-90's.  Patient Active Problem List   Diagnosis    Pregnancy    Previous  section    Chronic hypertension affecting pregnancy    Previous gestational diabetes mellitus, antepartum    Anxiety during pregnancy    Family history of congenital heart defect       Flu Status: Already given in current flu season  Ultrasound Today: No  Non Stress Test: Yes minutes 20  Reactive  CHTN    ROS -   Patient Reports :  See above  Patient Denies: Loss of Fluid, Vaginal Spotting, Vision Changes, Headaches, Contractions, and Epigastric pain  Fetal Movement : normal  All other systems reviewed and are negative.       The additional following portions of the patient's history were reviewed and updated as appropriate: allergies, current medications, past family history, past medical history, past social history, past surgical history, and problem list.    I have reviewed and agree with the HPI, ROS, and historical information as entered above. Marianela Renteria MD      /82   Wt 105 kg (231 lb 12.8 oz)   LMP 2023   BMI 37.41 kg/m²         Marianela Renteria MD      EXAM:     Prenatal Vitals  BP: 122/82  Weight: 105 kg (231 lb 12.8 oz)   Fetal Heart Rate: NST               Urine Glucose Read-only: Negative  Urine Protein Read-only: Negative           Assessment and Plan    Problem List Items Addressed This Visit          Gynecologic and Obstetric Problems    Chronic hypertension affecting pregnancy    Overview     Labetelol 100 BID at nob, baseline PEP 24h urine 120mg  Baby asa, 200 TID at 24 wks           Relevant Medications    labetalol (NORMODYNE) 100 MG tablet    Anxiety during pregnancy    Overview      Zoloft at nob         Relevant Medications    sertraline (ZOLOFT) 50 MG tablet       Other    Pregnancy    Overview     Prev c/s 37 wks 8#2oz  EFW 64%ile 32 wks         Previous  section    Overview     Desires repeat c/s         Family history of congenital heart defect    Overview     Fetal echo wnl          Other Visit Diagnoses       Prenatal care in third trimester    -  Primary    Relevant Orders    POC Urinalysis Dipstick (Completed)    Chronic hypertension        Relevant Orders    Fetal Nonstress Test    Fetal Nonstress Test            Pregnancy at 34w0d. NST today reactive. Cont twice weekly monitoring for CHTN. BPs stable on labetelol  Fetal status reassuring.   Activity and Exercise discussed.  Fetal movement/PTL or Labor precautions  Return in about 4 days (around 2024) for F/U Prenatal, NST Next Visit.    Marianela Renteria MD  2024

## 2024-01-22 ENCOUNTER — ROUTINE PRENATAL (OUTPATIENT)
Dept: OBSTETRICS AND GYNECOLOGY | Facility: CLINIC | Age: 34
End: 2024-01-22
Payer: COMMERCIAL

## 2024-01-22 VITALS — BODY MASS INDEX: 37.7 KG/M2 | WEIGHT: 233.6 LBS | DIASTOLIC BLOOD PRESSURE: 80 MMHG | SYSTOLIC BLOOD PRESSURE: 128 MMHG

## 2024-01-22 DIAGNOSIS — O99.340 ANXIETY DURING PREGNANCY: ICD-10-CM

## 2024-01-22 DIAGNOSIS — Z98.891 PREVIOUS CESAREAN SECTION: ICD-10-CM

## 2024-01-22 DIAGNOSIS — Z34.90 PRENATAL CARE, ANTEPARTUM: Primary | ICD-10-CM

## 2024-01-22 DIAGNOSIS — Z3A.34 34 WEEKS GESTATION OF PREGNANCY: ICD-10-CM

## 2024-01-22 DIAGNOSIS — F41.9 ANXIETY DURING PREGNANCY: ICD-10-CM

## 2024-01-22 DIAGNOSIS — O10.919 CHRONIC HYPERTENSION AFFECTING PREGNANCY: ICD-10-CM

## 2024-01-22 DIAGNOSIS — Z82.79 FAMILY HISTORY OF CONGENITAL HEART DEFECT: ICD-10-CM

## 2024-01-22 LAB
GLUCOSE UR STRIP-MCNC: NEGATIVE MG/DL
PROT UR STRIP-MCNC: NEGATIVE MG/DL

## 2024-01-22 PROCEDURE — 59025 FETAL NON-STRESS TEST: CPT | Performed by: OBSTETRICS & GYNECOLOGY

## 2024-01-22 PROCEDURE — 0502F SUBSEQUENT PRENATAL CARE: CPT | Performed by: OBSTETRICS & GYNECOLOGY

## 2024-01-22 NOTE — PROGRESS NOTES
OB FOLLOW UP  CC- Here for care of pregnancy        Kalyani Sandhu is a 33 y.o.  34w3d patient being seen today for her obstetrical follow up visit. Patient reports occasional BH contractions and acid reflux relieved with Tums.     Her prenatal care is complicated by (and status) : Chronic HTN. Her average BP has been 120s-130s/80s- low 90s.  Patient Active Problem List   Diagnosis    Pregnancy    Previous  section    Chronic hypertension affecting pregnancy    Previous gestational diabetes mellitus, antepartum    Anxiety during pregnancy    Family history of congenital heart defect       Flu Status: Already given in current flu season  Ultrasound Today: No  Non Stress Test: Yes minutes 20  non-stress test: NST: Reactive  indication: Hypertension    ROS -   Patient Denies: Loss of Fluid, Vaginal Spotting, Vision Changes, Headaches, Nausea , Vomiting , and Epigastric pain  Fetal Movement : normal  All other systems reviewed and are negative.       The additional following portions of the patient's history were reviewed and updated as appropriate: allergies, current medications, past family history, past medical history, past social history, past surgical history, and problem list.    I have reviewed and agree with the HPI, ROS, and historical information as entered above. Marianela Renteria MD      /80   Wt 106 kg (233 lb 9.6 oz)   LMP 2023   BMI 37.70 kg/m²       EXAM:     Prenatal Vitals  BP: 128/80  Weight: 106 kg (233 lb 9.6 oz)   Fetal Heart Rate: NST               Urine Glucose Read-only: Negative  Urine Protein Read-only: Negative           Assessment and Plan    Problem List Items Addressed This Visit          Gynecologic and Obstetric Problems    Chronic hypertension affecting pregnancy    Overview     Labetelol 100 BID at nob, baseline PEP 24h urine 120mg  Baby asa, 200 TID at 24 wks           Relevant Medications    labetalol (NORMODYNE) 100 MG tablet    Anxiety during  pregnancy    Overview     Zoloft at nob         Relevant Medications    sertraline (ZOLOFT) 50 MG tablet       Other    Pregnancy    Overview     Prev c/s 37 wks 8#2oz  EFW 64%ile 32 wks         Previous  section    Overview     Desires repeat c/s         Family history of congenital heart defect    Overview     Fetal echo wnl          Other Visit Diagnoses       Prenatal care, antepartum    -  Primary    Relevant Orders    POC Urinalysis Dipstick (Completed)    Fetal Nonstress Test            Pregnancy at 34w3d. NST today reactive. Cont twice weekly for CHTN.   Fetal status reassuring.   Activity and Exercise discussed.  Fetal movement/PTL or Labor precautions  Return in about 3 days (around 2024) for F/U Prenatal, NST Next Visit.    Marianela Renteria MD  2024

## 2024-01-23 ENCOUNTER — TELEPHONE (OUTPATIENT)
Dept: OBSTETRICS AND GYNECOLOGY | Facility: CLINIC | Age: 34
End: 2024-01-23
Payer: COMMERCIAL

## 2024-01-25 ENCOUNTER — ROUTINE PRENATAL (OUTPATIENT)
Dept: OBSTETRICS AND GYNECOLOGY | Facility: CLINIC | Age: 34
End: 2024-01-25
Payer: COMMERCIAL

## 2024-01-25 VITALS — DIASTOLIC BLOOD PRESSURE: 80 MMHG | SYSTOLIC BLOOD PRESSURE: 130 MMHG | WEIGHT: 232 LBS | BODY MASS INDEX: 37.45 KG/M2

## 2024-01-25 DIAGNOSIS — F41.9 ANXIETY DURING PREGNANCY: ICD-10-CM

## 2024-01-25 DIAGNOSIS — Z34.93 PRENATAL CARE IN THIRD TRIMESTER: Primary | ICD-10-CM

## 2024-01-25 DIAGNOSIS — Z82.79 FAMILY HISTORY OF CONGENITAL HEART DEFECT: ICD-10-CM

## 2024-01-25 DIAGNOSIS — O10.919 CHRONIC HYPERTENSION AFFECTING PREGNANCY: ICD-10-CM

## 2024-01-25 DIAGNOSIS — O99.340 ANXIETY DURING PREGNANCY: ICD-10-CM

## 2024-01-25 DIAGNOSIS — Z3A.34 34 WEEKS GESTATION OF PREGNANCY: ICD-10-CM

## 2024-01-25 DIAGNOSIS — Z98.891 PREVIOUS CESAREAN SECTION: ICD-10-CM

## 2024-01-25 LAB
GLUCOSE UR STRIP-MCNC: NEGATIVE MG/DL
PROT UR STRIP-MCNC: NEGATIVE MG/DL

## 2024-01-25 NOTE — PROGRESS NOTES
OB FOLLOW UP  CC- Here for care of pregnancy        Kalyani Sandhu is a 33 y.o.  34w6d patient being seen today for her obstetrical follow up visit. Patient reports occasional rossi williamson.     Her prenatal care is complicated by (and status) : Chronic HTN. Her average BP has been 120-130's/80's.  Patient Active Problem List   Diagnosis    Pregnancy    Previous  section    Chronic hypertension affecting pregnancy    Previous gestational diabetes mellitus, antepartum    Anxiety during pregnancy    Family history of congenital heart defect       Flu Status: Already given in current flu season  Ultrasound Today: No  Non Stress Test: Yes minutes 20  non-stress test: NST: Reactive  indication: Hypertension    ROS -   Patient Reports :  See above  Patient Denies: Loss of Fluid, Vaginal Spotting, Vision Changes, Headaches, Contractions, and Epigastric pain  Fetal Movement : normal  All other systems reviewed and are negative.       The additional following portions of the patient's history were reviewed and updated as appropriate: allergies, current medications, past family history, past medical history, past social history, past surgical history, and problem list.    I have reviewed and agree with the HPI, ROS, and historical information as entered above. Marianela Renteria MD      /80   Wt 105 kg (232 lb)   LMP 2023   BMI 37.45 kg/m²         EXAM:     Prenatal Vitals  BP: 130/80  Weight: 105 kg (232 lb)   Fetal Heart Rate: NST               Urine Glucose Read-only: Negative  Urine Protein Read-only: Negative           Assessment and Plan    Problem List Items Addressed This Visit          Gynecologic and Obstetric Problems    Chronic hypertension affecting pregnancy    Overview     Labetelol 100 BID at nob, baseline PEP 24h urine 120mg  Baby asa, 200 TID at 24 wks           Relevant Medications    labetalol (NORMODYNE) 100 MG tablet    Other Relevant Orders    Fetal Nonstress Test     Fetal Nonstress Test    Anxiety during pregnancy    Overview     Zoloft at nob         Relevant Medications    sertraline (ZOLOFT) 50 MG tablet       Other    Pregnancy    Overview     Prev c/s 37 wks 8#2oz  EFW 64%ile 32 wks         Previous  section    Overview     Desires repeat c/s- Scheduled.          Family history of congenital heart defect    Overview     Fetal echo wnl          Other Visit Diagnoses       Prenatal care in third trimester    -  Primary    Relevant Orders    POC Urinalysis Dipstick (Completed)            Pregnancy at 34w6d. NST today reactive. BP stable. Cont twice weekly monitoring.   Fetal status reassuring.   Activity and Exercise discussed.  Fetal movement/PTL or Labor precautions  Return in about 4 days (around 2024) for F/U Prenatal, NST Next Visit.    Marianela Renteria MD  2024

## 2024-01-29 ENCOUNTER — LAB (OUTPATIENT)
Dept: LAB | Facility: HOSPITAL | Age: 34
End: 2024-01-29
Payer: COMMERCIAL

## 2024-01-29 ENCOUNTER — ROUTINE PRENATAL (OUTPATIENT)
Dept: OBSTETRICS AND GYNECOLOGY | Facility: CLINIC | Age: 34
End: 2024-01-29
Payer: COMMERCIAL

## 2024-01-29 VITALS — WEIGHT: 232 LBS | SYSTOLIC BLOOD PRESSURE: 126 MMHG | DIASTOLIC BLOOD PRESSURE: 78 MMHG | BODY MASS INDEX: 37.45 KG/M2

## 2024-01-29 DIAGNOSIS — Z34.90 PRENATAL CARE, ANTEPARTUM: Primary | ICD-10-CM

## 2024-01-29 DIAGNOSIS — O99.340 ANXIETY DURING PREGNANCY: ICD-10-CM

## 2024-01-29 DIAGNOSIS — Z98.891 PREVIOUS CESAREAN SECTION: ICD-10-CM

## 2024-01-29 DIAGNOSIS — F41.9 ANXIETY DURING PREGNANCY: ICD-10-CM

## 2024-01-29 DIAGNOSIS — O09.299 PREVIOUS GESTATIONAL DIABETES MELLITUS, ANTEPARTUM: ICD-10-CM

## 2024-01-29 DIAGNOSIS — Z3A.36 36 WEEKS GESTATION OF PREGNANCY: ICD-10-CM

## 2024-01-29 DIAGNOSIS — O10.919 CHRONIC HYPERTENSION AFFECTING PREGNANCY: ICD-10-CM

## 2024-01-29 DIAGNOSIS — Z86.32 PREVIOUS GESTATIONAL DIABETES MELLITUS, ANTEPARTUM: ICD-10-CM

## 2024-01-29 LAB
GLUCOSE UR STRIP-MCNC: NEGATIVE MG/DL
PROT UR STRIP-MCNC: NEGATIVE MG/DL

## 2024-01-29 PROCEDURE — 0502F SUBSEQUENT PRENATAL CARE: CPT

## 2024-01-29 PROCEDURE — 59025 FETAL NON-STRESS TEST: CPT

## 2024-01-29 PROCEDURE — 87081 CULTURE SCREEN ONLY: CPT

## 2024-01-29 NOTE — PROGRESS NOTES
"      OB FOLLOW UP  CC- Here for care of pregnancy        Kalyani Sandhu is a 33 y.o.  35w3d patient being seen today for her obstetrical follow up visit. Patient reports her BP's at home have been 120-130/80's. She reports low back pain from \"overdoing it\" this weekend. She denies dysuria. She reported she has about 5 tiny bumps on her hands and 1 bump on her RT foot that ae very small and itch. She has not run a fever, but her daughter did have strep throat 3 weeks ago. She was concerned this could be HFM or some form of strep rash. She is otherwise asymptomatic.    Her prenatal care is complicated by (and status) :   Patient Active Problem List   Diagnosis    Pregnancy    Previous  section    Chronic hypertension affecting pregnancy    Previous gestational diabetes mellitus, antepartum    Anxiety during pregnancy    Family history of congenital heart defect       Flu Status: Already given in current flu season  Ultrasound Today: No  Non Stress Test: Yes minutes 30  non-stress test: NST: Reactive  indication: Hypertension  category: Category I      ROS -   Patient Denies: Loss of Fluid, Vaginal Spotting, Vision Changes, Headaches, Nausea , Vomiting , Contractions, and Epigastric pain  Fetal Movement : normal  All other systems reviewed and are negative.       The additional following portions of the patient's history were reviewed and updated as appropriate: allergies, current medications, past family history, past medical history, past social history, past surgical history, and problem list.    I have reviewed and agree with the HPI, ROS, and historical information as entered above. Julia Tyler, APRN      /78   Wt 105 kg (232 lb)   LMP 2023   BMI 37.45 kg/m²       EXAM:     Prenatal Vitals  BP: 126/78  Weight: 105 kg (232 lb)   Fetal Heart Rate: NST               Urine Glucose Read-only: Negative  Urine Protein Read-only: Negative           Assessment and Plan    Problem " List Items Addressed This Visit          Gravid and     Pregnancy    Overview     Prev c/s 37 wks 8#2oz  EFW 64%ile 32 wks         Previous  section    Overview     Desires repeat c/s- Scheduled.          Chronic hypertension affecting pregnancy    Overview     Labetelol 100 BID at nob, baseline PEP 24h urine 120mg  Baby asa, 200 TID at 24 wks           Relevant Medications    labetalol (NORMODYNE) 100 MG tablet    Previous gestational diabetes mellitus, antepartum    Overview     Early glucola wnl         Anxiety during pregnancy    Overview     Zoloft at nob         Relevant Medications    sertraline (ZOLOFT) 50 MG tablet     Other Visit Diagnoses       Prenatal care, antepartum    -  Primary    Relevant Orders    POC Urinalysis Dipstick (Completed)    Group B Streptococcus Culture - Swab, Vaginal/Rectum            Pregnancy at 35w3d  Fetal status reassuring. 30 min NST reactive 15 x 15  Activity and Exercise discussed.  Fetal movement/PTL or Labor precautions  Patient is on Prenatal vitamins  Reviewed Pre-eclampsia signs/symptoms  Discussed bASA for PIH prevention from 12 to 36wk  GBS today.  Patient c/o bumps on her hands that do not itch. However, I was only able to visualize one sore on her right pointer finger that looks like it is healing. Denies fever, malaise, or itching.  Signs of labor reviewed such as contractions five minutes apart getting closer together and stronger, decreased fetal movement, vaginal bleeding, or leaking of fluid. Reviewed Pre-eclampsia signs/symptoms-Headache not relieved by tylenol or rest, chest pain, shortness of breath, right upper quadrant pain, blurry vision-go to labor and delivery if office is closed or call office if open, patient verbalized understanding.   Work excuse to work from home this week and next prior to scheduled csection.  Follow up Thursday with NST    Julia Tyler, APRN  2024

## 2024-02-01 ENCOUNTER — ROUTINE PRENATAL (OUTPATIENT)
Dept: OBSTETRICS AND GYNECOLOGY | Facility: CLINIC | Age: 34
End: 2024-02-01
Payer: COMMERCIAL

## 2024-02-01 VITALS — BODY MASS INDEX: 37.09 KG/M2 | SYSTOLIC BLOOD PRESSURE: 126 MMHG | WEIGHT: 229.8 LBS | DIASTOLIC BLOOD PRESSURE: 84 MMHG

## 2024-02-01 DIAGNOSIS — Z98.891 PREVIOUS CESAREAN SECTION: ICD-10-CM

## 2024-02-01 DIAGNOSIS — Z34.90 PRENATAL CARE, ANTEPARTUM: Primary | ICD-10-CM

## 2024-02-01 DIAGNOSIS — O10.919 CHRONIC HYPERTENSION AFFECTING PREGNANCY: ICD-10-CM

## 2024-02-01 DIAGNOSIS — Z82.79 FAMILY HISTORY OF CONGENITAL HEART DEFECT: ICD-10-CM

## 2024-02-01 DIAGNOSIS — O99.340 ANXIETY DURING PREGNANCY: ICD-10-CM

## 2024-02-01 DIAGNOSIS — Z3A.35 35 WEEKS GESTATION OF PREGNANCY: ICD-10-CM

## 2024-02-01 DIAGNOSIS — F41.9 ANXIETY DURING PREGNANCY: ICD-10-CM

## 2024-02-01 LAB
BACTERIA SPEC AEROBE CULT: NORMAL
GLUCOSE UR STRIP-MCNC: NEGATIVE MG/DL
PROT UR STRIP-MCNC: NEGATIVE MG/DL

## 2024-02-01 NOTE — PROGRESS NOTES
OB FOLLOW UP  CC- Here for care of pregnancy        Kalyani Sandhu is a 33 y.o.  35w6d patient being seen today for her obstetrical follow up visit. Patient reports occasional irregular BH contractions.     Her prenatal care is complicated by (and status) : CHTN. Pt reports her BP has been averaging 120-130/80s at home. She is taking 200mg labetalol BID  Patient Active Problem List   Diagnosis    Pregnancy    Previous  section    Chronic hypertension affecting pregnancy    Previous gestational diabetes mellitus, antepartum    Anxiety during pregnancy    Family history of congenital heart defect       GBS Status:   Group B Strep Culture   Date Value Ref Range Status   2024 No Group B Streptococcus isolated  Final         Allergies   Allergen Reactions    Sulfa Antibiotics Dizziness          Flu Status: Already given in current flu season  Her Delivery Plan is: Repeat . Scheduled  24 1030  US today: no  Non Stress Test: Yes minutes 20  non-stress test: NST: Reactive  indication: Hypertension      ROS -   Patient Denies: Loss of Fluid, Vaginal Spotting, Vision Changes, Headaches, Nausea , Vomiting , Epigastric pain, and skin itching  Fetal Movement : normal  All other systems reviewed and are negative.       The additional following portions of the patient's history were reviewed and updated as appropriate: allergies, current medications, past family history, past medical history, past social history, past surgical history, and problem list.    I have reviewed and agree with the HPI, ROS, and historical information as entered above. Marianela Renteria MD        EXAM:     Prenatal Vitals  BP: 126/84  Weight: 104 kg (229 lb 12.8 oz)   Fetal Heart Rate: NST              Urine Glucose Read-only: Negative  Urine Protein Read-only: Negative           Assessment and Plan    Problem List Items Addressed This Visit          Gynecologic and Obstetric Problems    Chronic hypertension  affecting pregnancy    Overview     Labetelol 100 BID at nob, baseline PEP 24h urine 120mg  Baby asa, 200 TID at 24 wks           Relevant Medications    labetalol (NORMODYNE) 100 MG tablet    Anxiety during pregnancy    Overview     Zoloft at nob         Relevant Medications    sertraline (ZOLOFT) 50 MG tablet       Other    Pregnancy    Overview     Prev c/s 37 wks 8#2oz  EFW 64%ile 32 wks         Previous  section    Overview     Desires repeat c/s- Scheduled.          Family history of congenital heart defect    Overview     Fetal echo wnl          Other Visit Diagnoses       Prenatal care, antepartum    -  Primary    Relevant Orders    POC Urinalysis Dipstick (Completed)    Fetal Nonstress Test            Pregnancy at 35w6d. NST reactive. BP stable.   Fetal status reassuring.   Reviewed Pre-eclampsia signs/symptoms  Delivery options reviewed with patient  Signs of labor reviewed  Kick counts reviewed  Activity and Exercise discussed.  Return in about 4 days (around 2024) for F/U Prenatal, NST Next Visit.    Marianela Renteria MD  2024

## 2024-02-05 ENCOUNTER — ROUTINE PRENATAL (OUTPATIENT)
Dept: OBSTETRICS AND GYNECOLOGY | Facility: CLINIC | Age: 34
End: 2024-02-05
Payer: COMMERCIAL

## 2024-02-05 VITALS — WEIGHT: 231.6 LBS | BODY MASS INDEX: 37.38 KG/M2 | SYSTOLIC BLOOD PRESSURE: 132 MMHG | DIASTOLIC BLOOD PRESSURE: 80 MMHG

## 2024-02-05 DIAGNOSIS — O09.299 PREVIOUS GESTATIONAL DIABETES MELLITUS, ANTEPARTUM: ICD-10-CM

## 2024-02-05 DIAGNOSIS — Z86.32 PREVIOUS GESTATIONAL DIABETES MELLITUS, ANTEPARTUM: ICD-10-CM

## 2024-02-05 DIAGNOSIS — Z34.83 PRENATAL CARE, SUBSEQUENT PREGNANCY, THIRD TRIMESTER: Primary | ICD-10-CM

## 2024-02-05 DIAGNOSIS — Z82.79 FAMILY HISTORY OF CONGENITAL HEART DEFECT: ICD-10-CM

## 2024-02-05 DIAGNOSIS — O10.919 CHRONIC HYPERTENSION AFFECTING PREGNANCY: ICD-10-CM

## 2024-02-05 DIAGNOSIS — O99.340 ANXIETY DURING PREGNANCY: ICD-10-CM

## 2024-02-05 DIAGNOSIS — Z3A.36 36 WEEKS GESTATION OF PREGNANCY: ICD-10-CM

## 2024-02-05 DIAGNOSIS — Z98.891 PREVIOUS CESAREAN SECTION: ICD-10-CM

## 2024-02-05 DIAGNOSIS — F41.9 ANXIETY DURING PREGNANCY: ICD-10-CM

## 2024-02-05 LAB
GLUCOSE UR STRIP-MCNC: NEGATIVE MG/DL
PROT UR STRIP-MCNC: NEGATIVE MG/DL

## 2024-02-05 PROCEDURE — 59025 FETAL NON-STRESS TEST: CPT | Performed by: NURSE PRACTITIONER

## 2024-02-05 PROCEDURE — 0502F SUBSEQUENT PRENATAL CARE: CPT | Performed by: NURSE PRACTITIONER

## 2024-02-05 NOTE — PROGRESS NOTES
OB FOLLOW UP  CC- Here for care of pregnancy        Kalyani Sandhu is a 33 y.o.  36w3d patient being seen today for her obstetrical follow up visit. Patient reports trace swelling in left ankle( h/o fracture), acid reflux(TUMS resolves), and round ligament pains. Patient reports her BP's have bee averaging 120-130/80.    Her prenatal care is complicated by (and status) :  Patient Active Problem List   Diagnosis    Pregnancy    Previous  section    Chronic hypertension affecting pregnancy    Previous gestational diabetes mellitus, antepartum    Anxiety during pregnancy    Family history of congenital heart defect       GBS Status: was already done and is negative.    Allergies   Allergen Reactions    Sulfa Antibiotics Dizziness          Flu Status: Already given in current flu season  Her Delivery Plan is:  PAT-24 at 8:30 AM ; Repeat-C/s-24 at 1030.     US today: no  Non Stress Test: Yes minutes >20  non-stress test: NST: Reactive  indication: Hypertension    ROS -   Patient Denies: Loss of Fluid, Vaginal Spotting, Vision Changes, Headaches, Nausea , Vomiting , Contractions, Epigastric pain, and skin itching  Fetal Movement : normal  All other systems reviewed and are negative.       The additional following portions of the patient's history were reviewed and updated as appropriate: allergies and current medications.    I have reviewed and agree with the HPI, ROS, and historical information as entered above. Tom Bauer, APRN        EXAM:     Prenatal Vitals  BP: 132/80  Weight: 105 kg (231 lb 9.6 oz)   Fetal Heart Rate: NST              Urine Glucose Read-only: Negative  Urine Protein Read-only: Negative           Assessment and Plan    Problem List Items Addressed This Visit       Pregnancy    Overview     Prev c/s 37 wks 8#2oz  EFW 64%ile 32 wks         Previous  section    Overview     Desires repeat c/s- Scheduled.          Chronic hypertension affecting  pregnancy    Overview     Labetelol 100 BID at nob, baseline PEP 24h urine 120mg  Baby asa, 200 TID at 24 wks           Relevant Medications    labetalol (NORMODYNE) 100 MG tablet    Previous gestational diabetes mellitus, antepartum    Overview     Early glucola wnl         Anxiety during pregnancy    Overview     Zoloft at nob         Relevant Medications    sertraline (ZOLOFT) 50 MG tablet    Family history of congenital heart defect    Overview     Fetal echo wnl          Other Visit Diagnoses       Prenatal care, subsequent pregnancy, third trimester    -  Primary    Relevant Orders    POC Urinalysis Dipstick (Completed)            Pregnancy at 36w3d  Fetal status reassuring.   Reviewed Pre-eclampsia signs/symptoms  Signs of labor reviewed  Kick counts reviewed  Activity and Exercise discussed.  CS 2/13/24 @ 1030  Return in about 3 days (around 2/8/2024) for NST.    Tom Bauer, APRN  02/05/2024

## 2024-02-08 ENCOUNTER — ROUTINE PRENATAL (OUTPATIENT)
Dept: OBSTETRICS AND GYNECOLOGY | Facility: CLINIC | Age: 34
End: 2024-02-08
Payer: COMMERCIAL

## 2024-02-08 VITALS — DIASTOLIC BLOOD PRESSURE: 98 MMHG | BODY MASS INDEX: 37.45 KG/M2 | WEIGHT: 232 LBS | SYSTOLIC BLOOD PRESSURE: 138 MMHG

## 2024-02-08 DIAGNOSIS — F41.9 ANXIETY DURING PREGNANCY: ICD-10-CM

## 2024-02-08 DIAGNOSIS — Z3A.36 36 WEEKS GESTATION OF PREGNANCY: ICD-10-CM

## 2024-02-08 DIAGNOSIS — Z34.83 ENCOUNTER FOR SUPERVISION OF OTHER NORMAL PREGNANCY IN THIRD TRIMESTER: Primary | ICD-10-CM

## 2024-02-08 DIAGNOSIS — Z82.79 FAMILY HISTORY OF CONGENITAL HEART DEFECT: ICD-10-CM

## 2024-02-08 DIAGNOSIS — Z98.891 PREVIOUS CESAREAN SECTION: ICD-10-CM

## 2024-02-08 DIAGNOSIS — O10.919 CHRONIC HYPERTENSION AFFECTING PREGNANCY: ICD-10-CM

## 2024-02-08 DIAGNOSIS — O99.340 ANXIETY DURING PREGNANCY: ICD-10-CM

## 2024-02-08 LAB
GLUCOSE UR STRIP-MCNC: NEGATIVE MG/DL
PROT UR STRIP-MCNC: NEGATIVE MG/DL

## 2024-02-08 NOTE — PROGRESS NOTES
OB FOLLOW UP  CC- Here for care of pregnancy        Kalyani Sandhu is a 33 y.o.  36w6d patient being seen today for her obstetrical follow up visit. Patient reports her BP's at home have been 120-130s/80's since her last visit.  C/o facial edema.     Her prenatal care is complicated by (and status) :   Patient Active Problem List   Diagnosis    Pregnancy    Previous  section    Chronic hypertension affecting pregnancy    Previous gestational diabetes mellitus, antepartum    Anxiety during pregnancy    Family history of congenital heart defect       GBS Status:   Group B Strep Culture   Date Value Ref Range Status   2024 No Group B Streptococcus isolated  Final         Allergies   Allergen Reactions    Sulfa Antibiotics Dizziness          Flu Status: Already given in current flu season  Her Delivery Plan is: Repeat . Scheduled    US today: no  Non Stress Test: Yes minutes 20  Reactive  indication: Hypertension    ROS -   Patient Denies: Loss of Fluid, Vaginal Spotting, Vision Changes, Headaches, Nausea , Vomiting , Epigastric pain, and skin itching  Fetal Movement : normal  All other systems reviewed and are negative.       The additional following portions of the patient's history were reviewed and updated as appropriate: allergies, current medications, past family history, past medical history, past social history, past surgical history, and problem list.    I have reviewed and agree with the HPI, ROS, and historical information as entered above. Marianela Renteria MD        EXAM:     Prenatal Vitals  BP: 138/98  Weight: 105 kg (232 lb)   Fetal Heart Rate: NST              Urine Glucose Read-only: Negative  Urine Protein Read-only: Negative           Assessment and Plan    Problem List Items Addressed This Visit          Gynecologic and Obstetric Problems    Chronic hypertension affecting pregnancy    Overview     Labetelol 100 BID at nob, baseline PEP 24h urine 120mg  Baby asa,  200 TID at 24 wks           Relevant Medications    labetalol (NORMODYNE) 100 MG tablet    Anxiety during pregnancy    Overview     Zoloft at nob         Relevant Medications    sertraline (ZOLOFT) 50 MG tablet       Other    Pregnancy    Overview     Prev c/s 37 wks 8#2oz  EFW 64%ile 32 wks         Previous  section    Overview     Desires repeat c/s- Scheduled.          Family history of congenital heart defect    Overview     Fetal echo wnl          Other Visit Diagnoses       Encounter for supervision of other normal pregnancy in third trimester    -  Primary    Relevant Orders    POC Urinalysis Dipstick (Completed)            Pregnancy at 36w6d  Fetal status reassuring.   Reviewed Pre-eclampsia signs/symptoms  Reviewed upcoming c/s and PAT instructions with patient. Arrival time and NPO status reviewed.   Delivery options reviewed with patient  Signs of labor reviewed  Kick counts reviewed  Activity and Exercise discussed.  No follow-ups on file.    Marianela Renteria MD  2024

## 2024-02-09 ENCOUNTER — PREP FOR SURGERY (OUTPATIENT)
Dept: OTHER | Facility: HOSPITAL | Age: 34
End: 2024-02-09
Payer: COMMERCIAL

## 2024-02-09 DIAGNOSIS — O10.919 CHRONIC HYPERTENSION AFFECTING PREGNANCY: Primary | ICD-10-CM

## 2024-02-09 PROBLEM — O34.219 PREVIOUS CESAREAN DELIVERY AFFECTING PREGNANCY: Status: ACTIVE | Noted: 2024-02-09

## 2024-02-09 RX ORDER — CARBOPROST TROMETHAMINE 250 UG/ML
250 INJECTION, SOLUTION INTRAMUSCULAR AS NEEDED
OUTPATIENT
Start: 2024-02-09

## 2024-02-09 RX ORDER — SODIUM CHLORIDE 0.9 % (FLUSH) 0.9 %
10 SYRINGE (ML) INJECTION EVERY 12 HOURS SCHEDULED
OUTPATIENT
Start: 2024-02-09

## 2024-02-09 RX ORDER — LIDOCAINE HYDROCHLORIDE 10 MG/ML
0.5 INJECTION, SOLUTION EPIDURAL; INFILTRATION; INTRACAUDAL; PERINEURAL ONCE AS NEEDED
OUTPATIENT
Start: 2024-02-09

## 2024-02-09 RX ORDER — SODIUM CHLORIDE 9 MG/ML
40 INJECTION, SOLUTION INTRAVENOUS AS NEEDED
OUTPATIENT
Start: 2024-02-09

## 2024-02-09 RX ORDER — SODIUM CHLORIDE, SODIUM LACTATE, POTASSIUM CHLORIDE, CALCIUM CHLORIDE 600; 310; 30; 20 MG/100ML; MG/100ML; MG/100ML; MG/100ML
125 INJECTION, SOLUTION INTRAVENOUS CONTINUOUS
OUTPATIENT
Start: 2024-02-09

## 2024-02-09 RX ORDER — OXYTOCIN/0.9 % SODIUM CHLORIDE 30/500 ML
85 PLASTIC BAG, INJECTION (ML) INTRAVENOUS ONCE
OUTPATIENT
Start: 2024-02-09 | End: 2024-02-09

## 2024-02-09 RX ORDER — OXYTOCIN/0.9 % SODIUM CHLORIDE 30/500 ML
650 PLASTIC BAG, INJECTION (ML) INTRAVENOUS ONCE
OUTPATIENT
Start: 2024-02-09 | End: 2024-02-09

## 2024-02-09 RX ORDER — SODIUM CHLORIDE 0.9 % (FLUSH) 0.9 %
10 SYRINGE (ML) INJECTION AS NEEDED
OUTPATIENT
Start: 2024-02-09

## 2024-02-09 RX ORDER — METHYLERGONOVINE MALEATE 0.2 MG/ML
200 INJECTION INTRAVENOUS ONCE AS NEEDED
OUTPATIENT
Start: 2024-02-09

## 2024-02-09 RX ORDER — ACETAMINOPHEN 500 MG
1000 TABLET ORAL ONCE
OUTPATIENT
Start: 2024-02-09 | End: 2024-02-09

## 2024-02-09 RX ORDER — CITRIC ACID/SODIUM CITRATE 334-500MG
30 SOLUTION, ORAL ORAL ONCE
OUTPATIENT
Start: 2024-02-09 | End: 2024-02-09

## 2024-02-09 RX ORDER — MISOPROSTOL 200 UG/1
800 TABLET ORAL AS NEEDED
OUTPATIENT
Start: 2024-02-09

## 2024-02-09 RX ORDER — KETOROLAC TROMETHAMINE 30 MG/ML
30 INJECTION, SOLUTION INTRAMUSCULAR; INTRAVENOUS ONCE
OUTPATIENT
Start: 2024-02-09 | End: 2024-02-09

## 2024-02-12 ENCOUNTER — PRE-ADMISSION TESTING (OUTPATIENT)
Dept: PREADMISSION TESTING | Facility: HOSPITAL | Age: 34
End: 2024-02-12
Payer: COMMERCIAL

## 2024-02-12 VITALS — HEIGHT: 66 IN | BODY MASS INDEX: 37.7 KG/M2 | WEIGHT: 234.57 LBS

## 2024-02-12 DIAGNOSIS — O10.919 CHRONIC HYPERTENSION AFFECTING PREGNANCY: ICD-10-CM

## 2024-02-12 LAB
ABO GROUP BLD: NORMAL
ALP SERPL-CCNC: 136 U/L (ref 39–117)
ALT SERPL W P-5'-P-CCNC: 10 U/L (ref 1–33)
AST SERPL-CCNC: 34 U/L (ref 1–32)
BILIRUB SERPL-MCNC: 0.2 MG/DL (ref 0–1.2)
BLD GP AB SCN SERPL QL: NEGATIVE
CREAT SERPL-MCNC: 0.69 MG/DL (ref 0.57–1)
DEPRECATED RDW RBC AUTO: 50.8 FL (ref 37–54)
ERYTHROCYTE [DISTWIDTH] IN BLOOD BY AUTOMATED COUNT: 14.9 % (ref 12.3–15.4)
HCT VFR BLD AUTO: 36 % (ref 34–46.6)
HGB BLD-MCNC: 12.2 G/DL (ref 12–15.9)
LDH SERPL-CCNC: 221 U/L (ref 135–214)
MCH RBC QN AUTO: 31.9 PG (ref 26.6–33)
MCHC RBC AUTO-ENTMCNC: 33.9 G/DL (ref 31.5–35.7)
MCV RBC AUTO: 94.2 FL (ref 79–97)
PLATELET # BLD AUTO: 169 10*3/MM3 (ref 140–450)
PMV BLD AUTO: 12 FL (ref 6–12)
RBC # BLD AUTO: 3.82 10*6/MM3 (ref 3.77–5.28)
RH BLD: POSITIVE
T PALLIDUM IGG SER QL: NORMAL
T&S EXPIRATION DATE: NORMAL
URATE SERPL-MCNC: 5.5 MG/DL (ref 2.4–5.7)
WBC NRBC COR # BLD AUTO: 8.12 10*3/MM3 (ref 3.4–10.8)

## 2024-02-12 PROCEDURE — 84550 ASSAY OF BLOOD/URIC ACID: CPT

## 2024-02-12 PROCEDURE — 86850 RBC ANTIBODY SCREEN: CPT

## 2024-02-12 PROCEDURE — 86901 BLOOD TYPING SEROLOGIC RH(D): CPT

## 2024-02-12 PROCEDURE — 84075 ASSAY ALKALINE PHOSPHATASE: CPT

## 2024-02-12 PROCEDURE — 82247 BILIRUBIN TOTAL: CPT

## 2024-02-12 PROCEDURE — 83615 LACTATE (LD) (LDH) ENZYME: CPT

## 2024-02-12 PROCEDURE — 84460 ALANINE AMINO (ALT) (SGPT): CPT

## 2024-02-12 PROCEDURE — 86780 TREPONEMA PALLIDUM: CPT | Performed by: OBSTETRICS & GYNECOLOGY

## 2024-02-12 PROCEDURE — 36415 COLL VENOUS BLD VENIPUNCTURE: CPT

## 2024-02-12 PROCEDURE — 85027 COMPLETE CBC AUTOMATED: CPT

## 2024-02-12 PROCEDURE — 82565 ASSAY OF CREATININE: CPT

## 2024-02-12 PROCEDURE — 84450 TRANSFERASE (AST) (SGOT): CPT

## 2024-02-12 PROCEDURE — 86900 BLOOD TYPING SEROLOGIC ABO: CPT

## 2024-02-12 NOTE — PAT
An arrival time for procedure was not provided during PAT visit. If patient had any questions or concerns about their arrival time, they were instructed to contact their surgeon/physician.  Additionally, if the patient referred to an arrival time that was acquired from their my chart account, patient was encouraged to verify that time with their surgeon/physician. Arrival times are NOT provided in Pre Admission Testing Department.    Patient denies any current skin issues.     Patient to apply Chlorhexadine wipes  to surgical area (as instructed) the night before procedure and the AM of procedure. Wipes provided.    Patient instructed to drink 20 ounces of Gatorade or Gatorlyte (if diabetic) and it needs to be completed 1 hour (for Main OR patients) or 2 hours (scheduled  section & [facility] patients) before given arrival time for procedure (NO RED Gatorade and NO Gatorade Zero).    Patient verbalized understanding.    Instructed patient to take two Tylenol extra strength (total of 1000 mg) the night before surgery.    Blood bank bracelet applied to patient during Pre Admission Testing visit.  Patient instructed not to remove from arm until after procedure and they are discharged from the hospital.  Explained to patient that they may shower and get the bracelet wet, but not to immerse under water for longer periods (bathing, swimming, hand dishwashing, etc).  Patient verbalized understanding.

## 2024-02-12 NOTE — DISCHARGE INSTRUCTIONS
What to know before your arrive:    -Do not eat, drink or chew gum beginning 8 hours before your scheduled arrival time to the hospital.  Except please drink 20 ounces of Gatorade and complete two hours before your given arrival time to the hospital.  If you drink too close to surgery time, your sugery may  be delayed or cancelled.  Please complete as instructed.     -Do not shave any part of your body including abdomen or pelvic are for two days before your procedure.  -If you are taking a scheduled medication (insulin, blood pressure medicine,antibiotics) please consult with your physician whether to take on the day of surgery.  -Remove all jewelry including rings, wedding bands, and piercing before coming to the hospital.  -Leave important valuables at home.  -Do not wear dark fingernail polish.  -Please take two Tylenol 500 mg tablets the evening before surgery.  -Bring the following with you to the hospital:    -Picture ID and insurance, Medicare or Medicaid cards    -Co-pay/deductible required by insurance (Cash, Check, Credit Card)    -Copy of living will or power  document (if applicable)    -CPAP mask and tubing, not machine (if applicable)    -Skin prep instructions sheet    What to know the day of procedure:    -Park in the [facility], take elevator for first floor, exit to the right and  proceed through the doors to outside, follow the covered sidewalk to the entrance of the [place] [facility], follow the [facility] and [facility], enter the [facility] to your right BEFORE entering the 1720 lobby.  Take the elevators to the 3rd floor ([address] [facility]).  -Leave unnecessary items in your vehicle, including your suitcase.  Your support  person or a family member can get it for you after your procedure.   -Check in at the reception desk in the lobby of the 3rd floor ([address] [facility]).   -One person may accompany you to the pre-op/recovery area.  Please have  other family members wait in the  waiting room.   -An anesthesiologist will meet with your prior to your procedure.   -After anesthesia has been initiated, one person may accompany you in the  operating room.   -No video cameras are permitted in the operating room; only still cameras,  Please.      What to expect while you are in recovery:     -One person may stay with you while you are in recovery.   -If the baby is stable, he/she may visit to initiate breastfeeding & [facility].      CHLORHEXIDINE GLUCONATE WIPES AND INSTRUCTIONS GIVEN TO PATIENT

## 2024-02-13 ENCOUNTER — ANESTHESIA EVENT (OUTPATIENT)
Dept: LABOR AND DELIVERY | Facility: HOSPITAL | Age: 34
End: 2024-02-13
Payer: COMMERCIAL

## 2024-02-13 ENCOUNTER — HOSPITAL ENCOUNTER (INPATIENT)
Facility: HOSPITAL | Age: 34
LOS: 2 days | Discharge: HOME | End: 2024-02-15
Attending: OBSTETRICS & GYNECOLOGY | Admitting: OBSTETRICS & GYNECOLOGY
Payer: COMMERCIAL

## 2024-02-13 ENCOUNTER — ANESTHESIA (OUTPATIENT)
Dept: LABOR AND DELIVERY | Facility: HOSPITAL | Age: 34
End: 2024-02-13
Payer: COMMERCIAL

## 2024-02-13 DIAGNOSIS — O34.219 PREVIOUS CESAREAN DELIVERY AFFECTING PREGNANCY: Primary | ICD-10-CM

## 2024-02-13 DIAGNOSIS — O10.919 CHRONIC HYPERTENSION AFFECTING PREGNANCY: ICD-10-CM

## 2024-02-13 PROCEDURE — 25010000002 MORPHINE PER 10 MG: Performed by: NURSE ANESTHETIST, CERTIFIED REGISTERED

## 2024-02-13 PROCEDURE — 25810000003 LACTATED RINGERS PER 1000 ML: Performed by: OBSTETRICS & GYNECOLOGY

## 2024-02-13 PROCEDURE — 25010000002 METOCLOPRAMIDE PER 10 MG: Performed by: NURSE ANESTHETIST, CERTIFIED REGISTERED

## 2024-02-13 PROCEDURE — 25010000002 BUPIVACAINE IN DEXTROSE 0.75-8.25 % SOLUTION: Performed by: NURSE ANESTHETIST, CERTIFIED REGISTERED

## 2024-02-13 PROCEDURE — 25810000003 LACTATED RINGERS SOLUTION: Performed by: OBSTETRICS & GYNECOLOGY

## 2024-02-13 PROCEDURE — 25010000002 MIDAZOLAM PER 1 MG: Performed by: NURSE ANESTHETIST, CERTIFIED REGISTERED

## 2024-02-13 PROCEDURE — 25010000002 CEFAZOLIN PER 500 MG: Performed by: OBSTETRICS & GYNECOLOGY

## 2024-02-13 PROCEDURE — 25010000002 KETOROLAC TROMETHAMINE PER 15 MG: Performed by: OBSTETRICS & GYNECOLOGY

## 2024-02-13 PROCEDURE — 25010000002 FENTANYL CITRATE (PF) 100 MCG/2ML SOLUTION: Performed by: NURSE ANESTHETIST, CERTIFIED REGISTERED

## 2024-02-13 PROCEDURE — 25010000002 ONDANSETRON PER 1 MG: Performed by: NURSE ANESTHETIST, CERTIFIED REGISTERED

## 2024-02-13 PROCEDURE — 59510 CESAREAN DELIVERY: CPT | Performed by: OBSTETRICS & GYNECOLOGY

## 2024-02-13 RX ORDER — OXYCODONE HYDROCHLORIDE 5 MG/1
5 TABLET ORAL EVERY 4 HOURS PRN
Status: DISCONTINUED | OUTPATIENT
Start: 2024-02-13 | End: 2024-02-15 | Stop reason: HOSPADM

## 2024-02-13 RX ORDER — LABETALOL 200 MG/1
200 TABLET, FILM COATED ORAL 2 TIMES DAILY
Status: DISCONTINUED | OUTPATIENT
Start: 2024-02-13 | End: 2024-02-15 | Stop reason: HOSPADM

## 2024-02-13 RX ORDER — FAMOTIDINE 10 MG/ML
INJECTION, SOLUTION INTRAVENOUS AS NEEDED
Status: DISCONTINUED | OUTPATIENT
Start: 2024-02-13 | End: 2024-02-13 | Stop reason: SURG

## 2024-02-13 RX ORDER — MORPHINE SULFATE 0.5 MG/ML
INJECTION, SOLUTION EPIDURAL; INTRATHECAL; INTRAVENOUS AS NEEDED
Status: DISCONTINUED | OUTPATIENT
Start: 2024-02-13 | End: 2024-02-13 | Stop reason: SURG

## 2024-02-13 RX ORDER — CARBOPROST TROMETHAMINE 250 UG/ML
250 INJECTION, SOLUTION INTRAMUSCULAR AS NEEDED
Status: DISCONTINUED | OUTPATIENT
Start: 2024-02-13 | End: 2024-02-15 | Stop reason: HOSPADM

## 2024-02-13 RX ORDER — FENTANYL CITRATE 50 UG/ML
INJECTION, SOLUTION INTRAMUSCULAR; INTRAVENOUS AS NEEDED
Status: DISCONTINUED | OUTPATIENT
Start: 2024-02-13 | End: 2024-02-13 | Stop reason: SURG

## 2024-02-13 RX ORDER — SODIUM CHLORIDE 0.9 % (FLUSH) 0.9 %
10 SYRINGE (ML) INJECTION EVERY 12 HOURS SCHEDULED
Status: DISCONTINUED | OUTPATIENT
Start: 2024-02-13 | End: 2024-02-13 | Stop reason: HOSPADM

## 2024-02-13 RX ORDER — ONDANSETRON 2 MG/ML
4 INJECTION INTRAMUSCULAR; INTRAVENOUS ONCE AS NEEDED
Status: ACTIVE | OUTPATIENT
Start: 2024-02-13 | End: 2024-02-14

## 2024-02-13 RX ORDER — DOCUSATE SODIUM 100 MG/1
100 CAPSULE, LIQUID FILLED ORAL 2 TIMES DAILY PRN
Status: DISCONTINUED | OUTPATIENT
Start: 2024-02-13 | End: 2024-02-15 | Stop reason: HOSPADM

## 2024-02-13 RX ORDER — PRENATAL VIT/IRON FUM/FOLIC AC 27MG-0.8MG
1 TABLET ORAL DAILY
Status: DISCONTINUED | OUTPATIENT
Start: 2024-02-13 | End: 2024-02-15 | Stop reason: HOSPADM

## 2024-02-13 RX ORDER — BUPIVACAINE HYDROCHLORIDE 7.5 MG/ML
INJECTION, SOLUTION INTRASPINAL AS NEEDED
Status: DISCONTINUED | OUTPATIENT
Start: 2024-02-13 | End: 2024-02-13 | Stop reason: SURG

## 2024-02-13 RX ORDER — OXYTOCIN/0.9 % SODIUM CHLORIDE 30/500 ML
650 PLASTIC BAG, INJECTION (ML) INTRAVENOUS ONCE
Status: DISCONTINUED | OUTPATIENT
Start: 2024-02-13 | End: 2024-02-13 | Stop reason: HOSPADM

## 2024-02-13 RX ORDER — ONDANSETRON 2 MG/ML
INJECTION INTRAMUSCULAR; INTRAVENOUS AS NEEDED
Status: DISCONTINUED | OUTPATIENT
Start: 2024-02-13 | End: 2024-02-13 | Stop reason: SURG

## 2024-02-13 RX ORDER — ACETAMINOPHEN 325 MG/1
650 TABLET ORAL EVERY 6 HOURS
Status: DISCONTINUED | OUTPATIENT
Start: 2024-02-14 | End: 2024-02-15 | Stop reason: HOSPADM

## 2024-02-13 RX ORDER — DIPHENHYDRAMINE HYDROCHLORIDE 50 MG/ML
25 INJECTION INTRAMUSCULAR; INTRAVENOUS ONCE AS NEEDED
Status: DISCONTINUED | OUTPATIENT
Start: 2024-02-13 | End: 2024-02-15 | Stop reason: HOSPADM

## 2024-02-13 RX ORDER — PROMETHAZINE HYDROCHLORIDE 12.5 MG/1
12.5 SUPPOSITORY RECTAL EVERY 6 HOURS PRN
Status: DISCONTINUED | OUTPATIENT
Start: 2024-02-13 | End: 2024-02-15 | Stop reason: HOSPADM

## 2024-02-13 RX ORDER — KETOROLAC TROMETHAMINE 15 MG/ML
15 INJECTION, SOLUTION INTRAMUSCULAR; INTRAVENOUS EVERY 6 HOURS
Status: COMPLETED | OUTPATIENT
Start: 2024-02-13 | End: 2024-02-14

## 2024-02-13 RX ORDER — IBUPROFEN 600 MG/1
600 TABLET ORAL EVERY 6 HOURS
Status: DISCONTINUED | OUTPATIENT
Start: 2024-02-14 | End: 2024-02-15 | Stop reason: HOSPADM

## 2024-02-13 RX ORDER — NALOXONE HCL 0.4 MG/ML
0.4 VIAL (ML) INJECTION
Status: ACTIVE | OUTPATIENT
Start: 2024-02-13 | End: 2024-02-14

## 2024-02-13 RX ORDER — CALCIUM CARBONATE 500 MG/1
1 TABLET, CHEWABLE ORAL EVERY 4 HOURS PRN
Status: DISCONTINUED | OUTPATIENT
Start: 2024-02-13 | End: 2024-02-15 | Stop reason: HOSPADM

## 2024-02-13 RX ORDER — ENOXAPARIN SODIUM 100 MG/ML
40 INJECTION SUBCUTANEOUS NIGHTLY
Status: DISCONTINUED | OUTPATIENT
Start: 2024-02-14 | End: 2024-02-15 | Stop reason: HOSPADM

## 2024-02-13 RX ORDER — LIDOCAINE HYDROCHLORIDE 10 MG/ML
0.5 INJECTION, SOLUTION EPIDURAL; INFILTRATION; INTRACAUDAL; PERINEURAL ONCE AS NEEDED
Status: DISCONTINUED | OUTPATIENT
Start: 2024-02-13 | End: 2024-02-13 | Stop reason: HOSPADM

## 2024-02-13 RX ORDER — MIDAZOLAM HYDROCHLORIDE 1 MG/ML
INJECTION INTRAMUSCULAR; INTRAVENOUS AS NEEDED
Status: DISCONTINUED | OUTPATIENT
Start: 2024-02-13 | End: 2024-02-13 | Stop reason: SURG

## 2024-02-13 RX ORDER — SODIUM CHLORIDE 9 MG/ML
40 INJECTION, SOLUTION INTRAVENOUS AS NEEDED
Status: DISCONTINUED | OUTPATIENT
Start: 2024-02-13 | End: 2024-02-13 | Stop reason: HOSPADM

## 2024-02-13 RX ORDER — MISOPROSTOL 200 UG/1
600 TABLET ORAL AS NEEDED
Status: DISCONTINUED | OUTPATIENT
Start: 2024-02-13 | End: 2024-02-15 | Stop reason: HOSPADM

## 2024-02-13 RX ORDER — ACETAMINOPHEN 500 MG
1000 TABLET ORAL ONCE
Status: COMPLETED | OUTPATIENT
Start: 2024-02-13 | End: 2024-02-13

## 2024-02-13 RX ORDER — KETOROLAC TROMETHAMINE 30 MG/ML
30 INJECTION, SOLUTION INTRAMUSCULAR; INTRAVENOUS ONCE
Status: COMPLETED | OUTPATIENT
Start: 2024-02-13 | End: 2024-02-13

## 2024-02-13 RX ORDER — ALUMINA, MAGNESIA, AND SIMETHICONE 2400; 2400; 240 MG/30ML; MG/30ML; MG/30ML
15 SUSPENSION ORAL EVERY 4 HOURS PRN
Status: DISCONTINUED | OUTPATIENT
Start: 2024-02-13 | End: 2024-02-15 | Stop reason: HOSPADM

## 2024-02-13 RX ORDER — HYDROCORTISONE 25 MG/G
1 CREAM TOPICAL AS NEEDED
Status: DISCONTINUED | OUTPATIENT
Start: 2024-02-13 | End: 2024-02-15 | Stop reason: HOSPADM

## 2024-02-13 RX ORDER — CITRIC ACID/SODIUM CITRATE 334-500MG
30 SOLUTION, ORAL ORAL ONCE
Status: COMPLETED | OUTPATIENT
Start: 2024-02-13 | End: 2024-02-13

## 2024-02-13 RX ORDER — OXYTOCIN/0.9 % SODIUM CHLORIDE 30/500 ML
85 PLASTIC BAG, INJECTION (ML) INTRAVENOUS ONCE
Status: COMPLETED | OUTPATIENT
Start: 2024-02-13 | End: 2024-02-13

## 2024-02-13 RX ORDER — DIPHENHYDRAMINE HYDROCHLORIDE 50 MG/ML
25 INJECTION INTRAMUSCULAR; INTRAVENOUS EVERY 4 HOURS PRN
Status: DISCONTINUED | OUTPATIENT
Start: 2024-02-13 | End: 2024-02-15 | Stop reason: HOSPADM

## 2024-02-13 RX ORDER — METHYLERGONOVINE MALEATE 0.2 MG/ML
200 INJECTION INTRAVENOUS ONCE AS NEEDED
Status: DISCONTINUED | OUTPATIENT
Start: 2024-02-13 | End: 2024-02-13 | Stop reason: HOSPADM

## 2024-02-13 RX ORDER — CARBOPROST TROMETHAMINE 250 UG/ML
250 INJECTION, SOLUTION INTRAMUSCULAR AS NEEDED
Status: DISCONTINUED | OUTPATIENT
Start: 2024-02-13 | End: 2024-02-13 | Stop reason: HOSPADM

## 2024-02-13 RX ORDER — SIMETHICONE 80 MG
80 TABLET,CHEWABLE ORAL 4 TIMES DAILY PRN
Status: DISCONTINUED | OUTPATIENT
Start: 2024-02-13 | End: 2024-02-15 | Stop reason: HOSPADM

## 2024-02-13 RX ORDER — MISOPROSTOL 200 UG/1
800 TABLET ORAL AS NEEDED
Status: DISCONTINUED | OUTPATIENT
Start: 2024-02-13 | End: 2024-02-13 | Stop reason: HOSPADM

## 2024-02-13 RX ORDER — DIPHENHYDRAMINE HCL 25 MG
25 CAPSULE ORAL EVERY 4 HOURS PRN
Status: DISCONTINUED | OUTPATIENT
Start: 2024-02-13 | End: 2024-02-15 | Stop reason: HOSPADM

## 2024-02-13 RX ORDER — OXYTOCIN/0.9 % SODIUM CHLORIDE 30/500 ML
PLASTIC BAG, INJECTION (ML) INTRAVENOUS AS NEEDED
Status: DISCONTINUED | OUTPATIENT
Start: 2024-02-13 | End: 2024-02-13 | Stop reason: SURG

## 2024-02-13 RX ORDER — SODIUM CHLORIDE, SODIUM LACTATE, POTASSIUM CHLORIDE, CALCIUM CHLORIDE 600; 310; 30; 20 MG/100ML; MG/100ML; MG/100ML; MG/100ML
125 INJECTION, SOLUTION INTRAVENOUS CONTINUOUS
Status: DISCONTINUED | OUTPATIENT
Start: 2024-02-13 | End: 2024-02-13

## 2024-02-13 RX ORDER — METHYLERGONOVINE MALEATE 0.2 MG/ML
200 INJECTION INTRAVENOUS AS NEEDED
Status: DISCONTINUED | OUTPATIENT
Start: 2024-02-13 | End: 2024-02-15 | Stop reason: HOSPADM

## 2024-02-13 RX ORDER — ACETAMINOPHEN 500 MG
1000 TABLET ORAL EVERY 6 HOURS
Status: COMPLETED | OUTPATIENT
Start: 2024-02-13 | End: 2024-02-14

## 2024-02-13 RX ORDER — OXYTOCIN/0.9 % SODIUM CHLORIDE 30/500 ML
125 PLASTIC BAG, INJECTION (ML) INTRAVENOUS CONTINUOUS PRN
Status: DISCONTINUED | OUTPATIENT
Start: 2024-02-13 | End: 2024-02-15 | Stop reason: HOSPADM

## 2024-02-13 RX ORDER — SODIUM CHLORIDE 0.9 % (FLUSH) 0.9 %
10 SYRINGE (ML) INJECTION AS NEEDED
Status: DISCONTINUED | OUTPATIENT
Start: 2024-02-13 | End: 2024-02-13 | Stop reason: HOSPADM

## 2024-02-13 RX ORDER — OXYCODONE HYDROCHLORIDE 10 MG/1
10 TABLET ORAL EVERY 4 HOURS PRN
Status: DISCONTINUED | OUTPATIENT
Start: 2024-02-13 | End: 2024-02-15 | Stop reason: HOSPADM

## 2024-02-13 RX ORDER — METOCLOPRAMIDE HYDROCHLORIDE 5 MG/ML
INJECTION INTRAMUSCULAR; INTRAVENOUS AS NEEDED
Status: DISCONTINUED | OUTPATIENT
Start: 2024-02-13 | End: 2024-02-13 | Stop reason: SURG

## 2024-02-13 RX ORDER — PROMETHAZINE HYDROCHLORIDE 25 MG/1
25 TABLET ORAL EVERY 6 HOURS PRN
Status: DISCONTINUED | OUTPATIENT
Start: 2024-02-13 | End: 2024-02-15 | Stop reason: HOSPADM

## 2024-02-13 RX ADMIN — SODIUM CHLORIDE 2 G: 900 INJECTION INTRAVENOUS at 10:33

## 2024-02-13 RX ADMIN — ACETAMINOPHEN 1000 MG: 500 TABLET ORAL at 10:07

## 2024-02-13 RX ADMIN — BUPIVACAINE HYDROCHLORIDE IN DEXTROSE 1.6 ML: 7.5 INJECTION, SOLUTION SUBARACHNOID at 10:43

## 2024-02-13 RX ADMIN — FENTANYL CITRATE 20 MCG: 50 INJECTION, SOLUTION INTRAMUSCULAR; INTRAVENOUS at 10:43

## 2024-02-13 RX ADMIN — SODIUM CHLORIDE, POTASSIUM CHLORIDE, SODIUM LACTATE AND CALCIUM CHLORIDE 1000 ML: 600; 310; 30; 20 INJECTION, SOLUTION INTRAVENOUS at 10:06

## 2024-02-13 RX ADMIN — FAMOTIDINE 20 MG: 10 INJECTION, SOLUTION INTRAVENOUS at 10:38

## 2024-02-13 RX ADMIN — LABETALOL HYDROCHLORIDE 200 MG: 200 TABLET, FILM COATED ORAL at 21:52

## 2024-02-13 RX ADMIN — MIDAZOLAM HYDROCHLORIDE 1 MG: 1 INJECTION, SOLUTION INTRAMUSCULAR; INTRAVENOUS at 10:38

## 2024-02-13 RX ADMIN — MORPHINE SULFATE 150 MCG: 0.5 INJECTION, SOLUTION EPIDURAL; INTRATHECAL; INTRAVENOUS at 10:43

## 2024-02-13 RX ADMIN — KETOROLAC TROMETHAMINE 30 MG: 30 INJECTION, SOLUTION INTRAMUSCULAR; INTRAVENOUS at 12:36

## 2024-02-13 RX ADMIN — ACETAMINOPHEN 1000 MG: 500 TABLET ORAL at 15:27

## 2024-02-13 RX ADMIN — METOCLOPRAMIDE 10 MG: 5 INJECTION, SOLUTION INTRAMUSCULAR; INTRAVENOUS at 10:50

## 2024-02-13 RX ADMIN — Medication 85 ML/HR: at 12:00

## 2024-02-13 RX ADMIN — SODIUM CITRATE AND CITRIC ACID MONOHYDRATE 30 ML: 500; 334 SOLUTION ORAL at 10:33

## 2024-02-13 RX ADMIN — SODIUM CHLORIDE, POTASSIUM CHLORIDE, SODIUM LACTATE AND CALCIUM CHLORIDE: 600; 310; 30; 20 INJECTION, SOLUTION INTRAVENOUS at 11:05

## 2024-02-13 RX ADMIN — ONDANSETRON 4 MG: 2 INJECTION INTRAMUSCULAR; INTRAVENOUS at 10:38

## 2024-02-13 RX ADMIN — Medication 500 ML: at 11:10

## 2024-02-13 RX ADMIN — KETOROLAC TROMETHAMINE 15 MG: 15 INJECTION, SOLUTION INTRAMUSCULAR; INTRAVENOUS at 18:02

## 2024-02-13 RX ADMIN — ACETAMINOPHEN 1000 MG: 500 TABLET ORAL at 21:48

## 2024-02-13 RX ADMIN — SERTRALINE HYDROCHLORIDE 50 MG: 50 TABLET ORAL at 21:50

## 2024-02-13 RX ADMIN — SODIUM CHLORIDE, POTASSIUM CHLORIDE, SODIUM LACTATE AND CALCIUM CHLORIDE 999 ML/HR: 600; 310; 30; 20 INJECTION, SOLUTION INTRAVENOUS at 10:34

## 2024-02-13 NOTE — H&P
History and Physical:    Subjective     No chief complaint on file.      Kalyani Sandhu is a 33 y.o. year old  with an Estimated Date of Delivery: 3/1/24 currently at 37w4d presenting for scheduled  section.    Prenatal care has been significant for chronic hypertension without superimposed pre-eclampsia.        Review of Systems  Pertinent items are noted in HPI.     Past Medical History:   Diagnosis Date    Chronic hypertension     on labetalol    Fibroid     History of gestational diabetes     PONV (postoperative nausea and vomiting)     Scoliosis     Urinary tract infection     frequ uti prior to preg, seen by urology    Varicella     as a child     Past Surgical History:   Procedure Laterality Date     SECTION N/A 10/22/2019    Procedure:  SECTION PRIMARY;  Surgeon: Marianela Renteria MD;  Location: [facility] LABOR DELIVERY;  Service: Obstetrics/Gynecology    WISDOM TOOTH EXTRACTION       Family History   Problem Relation Age of Onset    Colon cancer Paternal Grandmother     Stroke Maternal Grandfather     Coronary artery disease Maternal Grandfather     Diabetes Maternal Uncle     Breast cancer Neg Hx     Ovarian cancer Neg Hx     Uterine cancer Neg Hx      Social History     Tobacco Use    Smoking status: Never    Smokeless tobacco: Never   Vaping Use    Vaping Use: Never used   Substance Use Topics    Alcohol use: No    Drug use: No     Medications Prior to Admission   Medication Sig Dispense Refill Last Dose    cetirizine (zyrTEC) 10 MG tablet Take 1 tablet by mouth Daily.   2024    labetalol (NORMODYNE) 100 MG tablet Take 2 tablets by mouth 2 (Two) Times a Day.   2024    Prenatal Vit-Fe Fumarate-FA (PRENATAL 27-1) 27-1 MG tablet tablet Take 1 tablet by mouth Daily.   2024    sertraline (ZOLOFT) 50 MG tablet Take 1 tablet by mouth Daily.   2024    carbonyl iron (FEOSOL) 45 MG tablet tablet Take 1 tablet by mouth Daily.        Allergies:  Sulfa  "antibiotics  OB History    Para Term  AB Living   2 1 1 0 0 1   SAB IAB Ectopic Molar Multiple Live Births   0 0 0 0 0 1      # Outcome Date GA Lbr Mushtaq/2nd Weight Sex Delivery Anes PTL Lv   2 Current            1 Term 10/22/19 37w6d  3677 g (8 lb 1.7 oz) F CS-LTranv Spinal N BRAYAN      Obstetric Comments   Fob #1 - Pregnancy #1 and #2    Pregnancy #1 - Gestational diabetes          Objective     Ht 167.6 cm (66\")   Wt 104 kg (230 lb)   LMP 2023   BMI 37.12 kg/m²     Physical Exam    General:  No acute distress           Abdomen: Gravid, nontender       FHT's: reactive             Lab Review   External Prenatal Results       Pregnancy Outside Results - Transcribed From Office Records - See Scanned Records For Details       Test Value Date Time    ABO  A  24 08    Rh  Positive  24 0827    Antibody Screen  Negative  24 0827       Negative  23 0954       Negative  23 1645       Negative  23 1456    Varicella IgG       Rubella  1.03 index 23 1456    Hgb  12.2 g/dL 24 0827       10.4 g/dL 23 0954       12.7 g/dL 23 1645       13.4 g/dL 23 1456    Hct  36.0 % 24 0827       32.5 % 23 0954       36.6 % 23 1645       40.5 % 23 1456    Glucose Fasting GTT  81 mg/dL 23 1516    Glucose Tolerance Test 1 hour  180 mg/dL 23 1516    Glucose Tolerance Test 3 hour  61 mg/dL 23 1516    Gonorrhea (discrete) ^ NEG  19     Chlamydia (discrete) ^ NEG  19     RPR  Non Reactive  23 1456    VDRL       Syphilis Antibody       HBsAg  Negative  23 1456    Herpes Simplex Virus PCR       Herpes Simplex VIrus Culture       HIV  Non Reactive  23 1456    Hep C RNA Quant PCR       Hep C Antibody  Non Reactive  23 1456    AFP       Group B Strep  No Group B Streptococcus isolated  24 1828    GBS Susceptibility to Clindamycin       GBS Susceptibility to Erythromycin       Fetal " Fibronectin       Genetic Testing, Maternal Blood                 Drug Screening       Test Value Date Time    Urine Drug Screen ^ neg  19     Amphetamine Screen  Negative ng/mL 23 1456    Barbiturate Screen  Negative ng/mL 23 1456    Benzodiazepine Screen  Negative ng/mL 23 1456    Methadone Screen  Negative ng/mL 23 1456    Phencyclidine Screen  Negative ng/mL 23 1456    Opiates Screen       THC Screen       Cocaine Screen       Propoxyphene Screen  Negative ng/mL 23 1456    Buprenorphine Screen       Methamphetamine Screen       Oxycodone Screen       Tricyclic Antidepressants Screen                 Legend    ^: Historical                                Assessment & Plan     ASSESSMENT  IUP at 37w4d, CHTN controlled on labetelol  Prev c/s, desires repeat  scheduled  section     PLAN  Admit to labor and delivery for C/S  Antibiotic prophylaxis and SCDs          Marianela Renteria MD  2024@

## 2024-02-13 NOTE — OP NOTE
"Operative Note    Patient name: Kalyani Sandhu  YOB: 1990   MRN: [id number]  Admission Date: 2024  Referring Provider: Marianela Renteria MD    ID: 33 y.o.  at 37w4d    Pre-Operative Dx:   Intrauterine pregnancy at 37w4d weeks   Previous  section - declines       Postoperative dx:    Intrauterine pregnancy at 37w4d weeks   Previous  section - declines             Procedure(s): repeatlow transverse  delivery     Surgeons: Surgeon(s) and Role:     * Marianela Renteria MD - Primary    Staff:  Surgeon(s):  Marianela Renteria MD           Assistant: Nicolasa Tate    Anesthesia: Spinal    Estimated Blood Loss:  500  mL        Preoperative antibiotic: Ancef (cefazolin) 2 grams    Blood products:   Blood Administration Record (From admission, onward)      None            Pathology: * No orders in the log *    Drains: Zamora catheter to gravity    Complications: None    Condition: Stable to recovery room    Infant:                Gender: male  infant    Weight: 3725 g (8 lb 3.4 oz)     Apgars:   @ 1 minute /       @ 5 minutes    Cord gases: Venous:  No results found for: \"PHCVEN\", \"BECVEN\"      Arterial:  No results found for: \"PHCART\", \"BECART\"          Operative Summary:   After obtaining informed consent the patient was taken to the operating room where adequate anesthesia was obtained.  Zamora catheter was placed in the bladder preoperatively.  IV antibiotics were given preoperatively.       The abdomen was prepped and draped in the usual sterile fashion for  delivery.  After confirming adequate anesthesia a Pfannenstiel skin incision was made with the scalpel and carried through to the underlying layer of fascia.  The fascia was incised in the midline and the incision extended laterally with the Mccall scissors and with blunt dissection.       The upper aspect of the fascia was grasped with 2 Kocher clamps, elevated, and dissected off the underlying rectus " muscles bluntly and with the Mccall scissors.  The Kocher clamps were removed and applied to the inferior aspect of the fascia.  The fascia was dissected off of the rectus muscles in the same fashion.  The peritoneum was entered bluntly.  The incision was stretched and the bladder blade and Light retractor inserted for visualization of the uterus. A bladder flap was made and bladder blade replaced.        The uterus was incised with the scalpel in a low transverse fashion.  The uterine incision was entered digitally and the incision extended bluntly in a craniocaudal fashion.  Retractors were removed and membranes were ruptured.  The infant was delivered atraumatically from vtx presentation.  The umbilical cord was milked 4 times, clamped and cut and the nose and mouth bulb suctioned.  The infant was handed off to waiting pediatric staff.       Cord blood gases were not collected.  Cord blood was collected.  The placenta was removed using cord traction and uterine massage.  The uterus was exteriorized and cleared of all clots and debris.  The uterine incision was repaired with #1 chromic in a running locked fashion. A double layer technique was used.  Additional hemostatic measures required: none.    The incision was inspected and excellent hemostasis was noted.  The tubes and ovaries were noted to be normal.   The uterus was returned to the abdomen.  The gutters were cleared of all clots and debris.  Irrigation was used.  The uterine incision was again inspected and found to be hemostatic.       The peritoneum was reapproximated with 2-0 Vicryl in a running fashion.  The fascia was closed with 0 Vicryl in a running fashion.  The subcutaneous space was reapproximated using 3-0 plain gut in interrupted stiches.      The skin was closed using staples, and dressing placed. All sharp, instrument, and sponge counts were correct. The patient was transferred to the recovery room in stable condition.    Surgical  Assist:          Marianela Renteria MD  2/13/2024

## 2024-02-14 LAB
BASOPHILS # BLD AUTO: 0.01 10*3/MM3 (ref 0–0.2)
BASOPHILS NFR BLD AUTO: 0.2 % (ref 0–1.5)
DEPRECATED RDW RBC AUTO: 51.1 FL (ref 37–54)
EOSINOPHIL # BLD AUTO: 0.05 10*3/MM3 (ref 0–0.4)
EOSINOPHIL NFR BLD AUTO: 0.8 % (ref 0.3–6.2)
ERYTHROCYTE [DISTWIDTH] IN BLOOD BY AUTOMATED COUNT: 15.1 % (ref 12.3–15.4)
HCT VFR BLD AUTO: 31.6 % (ref 34–46.6)
HGB BLD-MCNC: 10.3 G/DL (ref 12–15.9)
IMM GRANULOCYTES # BLD AUTO: 0.05 10*3/MM3 (ref 0–0.05)
IMM GRANULOCYTES NFR BLD AUTO: 0.8 % (ref 0–0.5)
LYMPHOCYTES # BLD AUTO: 0.93 10*3/MM3 (ref 0.7–3.1)
LYMPHOCYTES NFR BLD AUTO: 15.7 % (ref 19.6–45.3)
MCH RBC QN AUTO: 30.2 PG (ref 26.6–33)
MCHC RBC AUTO-ENTMCNC: 32.6 G/DL (ref 31.5–35.7)
MCV RBC AUTO: 92.7 FL (ref 79–97)
MONOCYTES # BLD AUTO: 0.4 10*3/MM3 (ref 0.1–0.9)
MONOCYTES NFR BLD AUTO: 6.7 % (ref 5–12)
NEUTROPHILS NFR BLD AUTO: 4.5 10*3/MM3 (ref 1.7–7)
NEUTROPHILS NFR BLD AUTO: 75.8 % (ref 42.7–76)
NRBC BLD AUTO-RTO: 0 /100 WBC (ref 0–0.2)
PLATELET # BLD AUTO: 133 10*3/MM3 (ref 140–450)
PMV BLD AUTO: 11.9 FL (ref 6–12)
RBC # BLD AUTO: 3.41 10*6/MM3 (ref 3.77–5.28)
WBC NRBC COR # BLD AUTO: 5.94 10*3/MM3 (ref 3.4–10.8)

## 2024-02-14 PROCEDURE — 0503F POSTPARTUM CARE VISIT: CPT | Performed by: NURSE PRACTITIONER

## 2024-02-14 PROCEDURE — 85025 COMPLETE CBC W/AUTO DIFF WBC: CPT | Performed by: OBSTETRICS & GYNECOLOGY

## 2024-02-14 PROCEDURE — 25010000002 ENOXAPARIN PER 10 MG: Performed by: OBSTETRICS & GYNECOLOGY

## 2024-02-14 PROCEDURE — 25010000002 KETOROLAC TROMETHAMINE PER 15 MG: Performed by: OBSTETRICS & GYNECOLOGY

## 2024-02-14 RX ADMIN — KETOROLAC TROMETHAMINE 15 MG: 15 INJECTION, SOLUTION INTRAMUSCULAR; INTRAVENOUS at 06:16

## 2024-02-14 RX ADMIN — ACETAMINOPHEN 1000 MG: 500 TABLET ORAL at 03:14

## 2024-02-14 RX ADMIN — SIMETHICONE 80 MG: 80 TABLET, CHEWABLE ORAL at 09:06

## 2024-02-14 RX ADMIN — SERTRALINE HYDROCHLORIDE 50 MG: 50 TABLET ORAL at 21:00

## 2024-02-14 RX ADMIN — ACETAMINOPHEN 1000 MG: 500 TABLET ORAL at 09:05

## 2024-02-14 RX ADMIN — KETOROLAC TROMETHAMINE 15 MG: 15 INJECTION, SOLUTION INTRAMUSCULAR; INTRAVENOUS at 01:00

## 2024-02-14 RX ADMIN — ACETAMINOPHEN 650 MG: 325 TABLET ORAL at 16:36

## 2024-02-14 RX ADMIN — PRENATAL VITAMINS-IRON FUMARATE 27 MG IRON-FOLIC ACID 0.8 MG TABLET 1 TABLET: at 09:06

## 2024-02-14 RX ADMIN — KETOROLAC TROMETHAMINE 15 MG: 15 INJECTION, SOLUTION INTRAMUSCULAR; INTRAVENOUS at 12:21

## 2024-02-14 RX ADMIN — IBUPROFEN 600 MG: 600 TABLET, FILM COATED ORAL at 18:31

## 2024-02-14 RX ADMIN — LABETALOL HYDROCHLORIDE 200 MG: 200 TABLET, FILM COATED ORAL at 09:06

## 2024-02-14 RX ADMIN — ENOXAPARIN SODIUM 40 MG: 100 INJECTION SUBCUTANEOUS at 21:01

## 2024-02-14 RX ADMIN — LABETALOL HYDROCHLORIDE 200 MG: 200 TABLET, FILM COATED ORAL at 21:00

## 2024-02-14 RX ADMIN — OXYCODONE HYDROCHLORIDE 5 MG: 5 TABLET ORAL at 18:37

## 2024-02-14 RX ADMIN — ACETAMINOPHEN 650 MG: 325 TABLET ORAL at 21:01

## 2024-02-14 NOTE — PROGRESS NOTES
Postpartum Progress Note    Patient name: Kalyani Sandhu  YOB: 1990   MRN: [id number]  Referring Provider: Marianela Renteria MD  Admission Date: 2024  Date of Service: 2024    ID: 33 y.o.     Diagnosis:   S/p  delivery 1 Day Post-Op     Previous  delivery affecting pregnancy       Subjective:      No complaints.  Moderate lochia.  Ambulating, voiding, tolerating diet.  Pain well controlled.  The patient is currently breastfeeding.   This baby is a male    Objective:      Vital signs:  Vital Signs Range for the last 24 hours  Temperature: Temp:  [97.6 °F (36.4 °C)-99.3 °F (37.4 °C)] 97.9 °F (36.6 °C)   Temp Source: Temp src: Oral   BP: BP: (112-139)/(60-97) 129/66   Pulse: Heart Rate:  [72-95] 87   Respirations: Resp:  [16-18] 16   Weight: 104 kg (230 lb)     General: Alert & oriented x4, in no apparent distress  Abdomen: soft, nontender  Uterus: firm, nontender  Incision: clean, dry, intact,  Extremities: nontender; no edema      Labs:  Lab Results   Component Value Date    WBC 5.94 2024    HGB 10.3 (L) 2024    HCT 31.6 (L) 2024    MCV 92.7 2024     (L) 2024     Results from last 7 days   Lab Units 24  0827   ABO TYPING  A   RH TYPING  Positive     External Prenatal Results       Pregnancy Outside Results - Transcribed From Office Records - See Scanned Records For Details       Test Value Date Time    ABO  A  24 08    Rh  Positive  24 0827    Antibody Screen  Negative  24 0827       Negative  23 0954       Negative  23 1645       Negative  23 1456    Varicella IgG       Rubella  1.03 index 23 1456    Hgb  10.3 g/dL 24 0559       12.2 g/dL 24 0827       10.4 g/dL 23 0954       12.7 g/dL 23 1645       13.4 g/dL 23 1456    Hct  31.6 % 24 0559       36.0 % 24 0827       32.5 % 23 0954       36.6 % 23 1645       40.5 % 23  1456    Glucose Fasting GTT  81 mg/dL 23 1516    Glucose Tolerance Test 1 hour  180 mg/dL 23 1516    Glucose Tolerance Test 3 hour  61 mg/dL 23 1516    Gonorrhea (discrete) ^ NEG  19     Chlamydia (discrete) ^ NEG  19     RPR  Non Reactive  23 1456    VDRL       Syphilis Antibody       HBsAg  Negative  23 1456    Herpes Simplex Virus PCR       Herpes Simplex VIrus Culture       HIV  Non Reactive  23 1456    Hep C RNA Quant PCR       Hep C Antibody  Non Reactive  23 1456    AFP       Group B Strep  No Group B Streptococcus isolated  24 1828    GBS Susceptibility to Clindamycin       GBS Susceptibility to Erythromycin       Fetal Fibronectin       Genetic Testing, Maternal Blood                 Drug Screening       Test Value Date Time    Urine Drug Screen ^ neg  19     Amphetamine Screen  Negative ng/mL 23 1456    Barbiturate Screen  Negative ng/mL 23 1456    Benzodiazepine Screen  Negative ng/mL 23 1456    Methadone Screen  Negative ng/mL 23 1456    Phencyclidine Screen  Negative ng/mL 23 1456    Opiates Screen       THC Screen       Cocaine Screen       Propoxyphene Screen  Negative ng/mL 23 1456    Buprenorphine Screen       Methamphetamine Screen       Oxycodone Screen       Tricyclic Antidepressants Screen                 Legend    ^: Historical                            Assessment/Plan:      1 Day Post-Op s/p Procedure(s):   SECTION REPEAT  1. S/p  delivery: Continue postoperative care.  Doing well.  2. Infant feeding: Supportive care.  The patient is currently breastfeeding. Male infant. Parents desire circumcision.  3. CHTN, BP well controlled on labetalol 200 mg bid.

## 2024-02-14 NOTE — LACTATION NOTE
02/14/24 1045   Maternal Information   Date of Referral 02/14/24   Person Making Referral lactation consultant  (courtesy follow-up)   Infant Reason for Referral other (see comments)  (Baby has been sluggish at the breast this morning.)   Maternal Assessment   Breast Size Issue none   Breast Shape Bilateral:;round   Breast Density Bilateral:;soft   Nipples Bilateral:;everted   Maternal Infant Feeding   Maternal Emotional State relaxed;receptive   Infant Positioning cross-cradle   Signs of Milk Transfer deep jaw excursions noted   Pain with Feeding no   Latch Assistance none needed  (After parent's were advised to burp baby before latching, they were able to get baby latched and nursing well.)   Support Person Involvement actively supporting mother     Baby latches and nurses very well.  Mom was encouraged to pre-burp baby and attempt latching every 3 hours and on demand.  She was shown how to massage her breast during feeding to push more milk to the baby. She was encouraged to call for assistance, as needed.

## 2024-02-15 VITALS
WEIGHT: 230 LBS | RESPIRATION RATE: 16 BRPM | SYSTOLIC BLOOD PRESSURE: 138 MMHG | HEIGHT: 66 IN | TEMPERATURE: 98.8 F | DIASTOLIC BLOOD PRESSURE: 78 MMHG | OXYGEN SATURATION: 98 % | HEART RATE: 88 BPM | BODY MASS INDEX: 36.96 KG/M2

## 2024-02-15 RX ORDER — OXYCODONE HYDROCHLORIDE 5 MG/1
5 TABLET ORAL EVERY 6 HOURS PRN
Qty: 12 TABLET | Refills: 0 | Status: SHIPPED | OUTPATIENT
Start: 2024-02-15 | End: 2024-02-18

## 2024-02-15 RX ORDER — IBUPROFEN 600 MG/1
600 TABLET ORAL EVERY 6 HOURS
Qty: 20 TABLET | Refills: 0 | Status: SHIPPED | OUTPATIENT
Start: 2024-02-15

## 2024-02-15 RX ADMIN — OXYCODONE HYDROCHLORIDE 5 MG: 5 TABLET ORAL at 09:06

## 2024-02-15 RX ADMIN — LABETALOL HYDROCHLORIDE 200 MG: 200 TABLET, FILM COATED ORAL at 07:54

## 2024-02-15 RX ADMIN — ACETAMINOPHEN 650 MG: 325 TABLET ORAL at 04:24

## 2024-02-15 RX ADMIN — IBUPROFEN 600 MG: 600 TABLET, FILM COATED ORAL at 06:31

## 2024-02-15 RX ADMIN — ACETAMINOPHEN 650 MG: 325 TABLET ORAL at 09:06

## 2024-02-15 RX ADMIN — PRENATAL VITAMINS-IRON FUMARATE 27 MG IRON-FOLIC ACID 0.8 MG TABLET 1 TABLET: at 07:53

## 2024-02-15 RX ADMIN — OXYCODONE HYDROCHLORIDE 5 MG: 5 TABLET ORAL at 04:24

## 2024-02-15 RX ADMIN — IBUPROFEN 600 MG: 600 TABLET, FILM COATED ORAL at 01:12

## 2024-02-15 NOTE — PLAN OF CARE
Goal Outcome Evaluation:  Plan of Care Reviewed With: patient, spouse        Progress: improving  Outcome Evaluation: VSS, fundus/lochia/incision WDL, voiding, pain controlled well with ERAS meds

## 2024-02-15 NOTE — DISCHARGE SUMMARY
Discharge Summary    Date of Admission: 2024  Date of Discharge:  2/15/2024      Patient: Kalyani Sandhu      MR#:[id number]    Primary Surgeon/OB: Marianela Renteria MD    Discharge Surgeon/OB: MIKAELA Renteria    Presenting Problem/History of Present Illness  Previous  delivery affecting pregnancy [O34.219]     Patient Active Problem List    Diagnosis     *Previous  delivery affecting pregnancy [O34.219]     Family history of congenital heart defect [Z82.79]     Pregnancy [Z34.90]     Previous  section [Z98.891]     Chronic hypertension affecting pregnancy [O10.919]     Previous gestational diabetes mellitus, antepartum [O09.299, Z86.32]     Anxiety during pregnancy [O99.340, F41.9]          Discharge Diagnosis:  section at 37w4d    Procedures:  , Low Transverse     2024    10:58 AM            Discharge Date: 2/15/2024; Discharge Time: 08:32 EST        Hospital Course  Patient is a 33 y.o. female  at 37w4d status post  section with uneventful postoperative recovery.  Patient was advanced to regular diet on postoperative day#1.  On discharge, ambulating, tolerating a regular diet without any difficulties and her incision is dry, clean and intact.     Infant:   male  fetus 3725 g (8 lb 3.4 oz)  with Apgar scores of 8 , 9  at five minutes.    Condition on Discharge:  Stable    Vital Signs  Temp:  [97.7 °F (36.5 °C)-98.8 °F (37.1 °C)] 98.8 °F (37.1 °C)  Heart Rate:  [] 88  Resp:  [16-18] 16  BP: (128-143)/(67-83) 143/70    Lab Results   Component Value Date    WBC 5.94 2024    HGB 10.3 (L) 2024    HCT 31.6 (L) 2024    MCV 92.7 2024     (L) 2024       Discharge Disposition  Home or Self Care    Discharge Medications     Discharge Medications        New Medications        Instructions Start Date   ibuprofen 600 MG tablet  Commonly known as: ADVIL,MOTRIN   600 mg, Oral, Every 6 Hours      oxyCODONE 5 MG immediate  release tablet  Commonly known as: ROXICODONE   5 mg, Oral, Every 6 Hours PRN             Continue These Medications        Instructions Start Date   carbonyl iron 45 MG tablet tablet  Commonly known as: FEOSOL   1 tablet, Oral, Daily      cetirizine 10 MG tablet  Commonly known as: zyrTEC   10 mg, Oral, Daily      labetalol 100 MG tablet  Commonly known as: NORMODYNE   200 mg, Oral, 2 Times Daily      Prenatal 27-1 27-1 MG tablet tablet   1 tablet, Oral, Daily      sertraline 50 MG tablet  Commonly known as: ZOLOFT   50 mg, Oral, Daily                   Follow-up Appointments  No future appointments.      Marianela Renteria MD  02/15/24  08:32 EST  [facility]

## 2024-02-16 ENCOUNTER — TELEPHONE (OUTPATIENT)
Dept: OBSTETRICS AND GYNECOLOGY | Facility: CLINIC | Age: 34
End: 2024-02-16
Payer: COMMERCIAL

## 2024-02-22 ENCOUNTER — MATERNAL SCREENING (OUTPATIENT)
Dept: CALL CENTER | Facility: HOSPITAL | Age: 34
End: 2024-02-22
Payer: COMMERCIAL

## 2024-02-22 NOTE — OUTREACH NOTE
Maternal Screening Survey      Flowsheet Row Responses   Facility patient discharged from? [facility]   Attempt successful? Yes   Call start time    Call end time    EPD Scale: Able to Laugh 0-->as much as she always could   EPD Scale: Looked Forward 0-->as much as she ever did   EPD Scale: Blamed Self 0-->no, never   EPD Scale: Been Anxious 0-->no, not at all   EPD Scale: Felt Panicky 0-->no, not at all   EPD Scale: Things Getting on Top 0-->no, has been coping as well as ever   EPD Scale: Difficulty Sleeping 0-->no, not at all   EPD Scale: Sad or Miserable 0-->no, not at all   EPD Scale: Crying 0-->no, never   EPD Scale: Thought of Harming Self 0-->never   [place]  Depression Score 0   Before you were pregnant did you have problems with alcohol or drug use? (past) No   In the past month, did you drink beer, wine, liquor or use any other drugs? (pregnancy) No   Maternal Screening call completed Yes   Call end time               Christine MCCLELLAN - Registered Nurse

## 2024-02-27 ENCOUNTER — POSTPARTUM VISIT (OUTPATIENT)
Dept: OBSTETRICS AND GYNECOLOGY | Facility: CLINIC | Age: 34
End: 2024-02-27
Payer: COMMERCIAL

## 2024-02-27 VITALS
HEIGHT: 66 IN | WEIGHT: 209 LBS | DIASTOLIC BLOOD PRESSURE: 90 MMHG | SYSTOLIC BLOOD PRESSURE: 130 MMHG | BODY MASS INDEX: 33.59 KG/M2

## 2024-02-27 DIAGNOSIS — Z48.89 ENCOUNTER FOR POST SURGICAL WOUND CHECK: ICD-10-CM

## 2024-02-27 PROCEDURE — 0503F POSTPARTUM CARE VISIT: CPT | Performed by: NURSE PRACTITIONER

## 2024-02-27 NOTE — PROGRESS NOTES
"      Chief Complaint   Patient presents with    Postpartum Care     Inc check       Postpartum Visit         Kalyani Sandhu is a 33 y.o.  who presents today for a 2 week(s) postpartum check.      C/S: repeat     , Low Transverse    Information for the patient's :  Jose Luis Sandhu [[id number]]   2024   male   Jose Luis Sandhu   3725 g (8 lb 3.4 oz)   Gestational Age: 37w4d      Baby Discharged with Mom  Delivering MD: Marianela Renteria MD.    Pregnancy complications: no known issues    Patient reports her incision  healing well . Patient describes vaginal bleeding as moderate. She is breastfeeding. She would like to discuss the following complaints today: none.    She desires to discuss  condoms  for contraception.    Patient reports concerns for postpartum depression/anxiety. Patient denies  suicidal or homicidal ideation. Her postpartum depression screening questionnaire: 8. She is currently being treated with Zoloft 50mg. She feels it is helping.      The additional following portions of the patient's history were reviewed and updated as appropriate: allergies, current medications, past family history, past medical history, past social history, past surgical history, and problem list.      Review of Systems   Constitutional: Negative.    HENT: Negative.     Respiratory: Negative.     Cardiovascular: Negative.    Gastrointestinal: Negative.    Genitourinary: Negative.    Musculoskeletal: Negative.    Skin: Negative.    Allergic/Immunologic: Negative.    Neurological: Negative.    Hematological: Negative.    Psychiatric/Behavioral: Negative.     All other systems reviewed and are negative.      I have reviewed and agree with the HPI, ROS, and historical information as entered above. Tom Bauer, APRN      Objective   /90   Ht 167.6 cm (66\")   Wt 94.8 kg (209 lb)   LMP 2023   Breastfeeding Yes   BMI 33.73 kg/m²     Physical Exam  Vitals and nursing " note reviewed.   Constitutional:       Appearance: Normal appearance. She is well-developed.   HENT:      Head: Normocephalic and atraumatic.   Cardiovascular:      Rate and Rhythm: Normal rate and regular rhythm.   Pulmonary:      Effort: Pulmonary effort is normal.      Breath sounds: Normal breath sounds.   Abdominal:      General: A surgical scar is present.      Palpations: Abdomen is soft. Abdomen is not rigid.          Comments: LTI CDI without s/s infection   Musculoskeletal:      Cervical back: Normal range of motion.   Neurological:      Mental Status: She is alert and oriented to person, place, and time.   Psychiatric:         Behavior: Behavior normal.              Assessment and Plan    Problem List Items Addressed This Visit    None  Visit Diagnoses       Postpartum follow-up    -  Primary    Encounter for post surgical wound check                S/p , 2 week(s) postpartum.  Doing well.    Continue to monitor BP and call if consistently staying >140/90. Weaning of medication discussed.  Continued pelvic rest with a return to driving and light physical activity.  Baby doing well.  Breastfeeding going well.  No si/sx of postpartum depression  Contraception: contraceptive methods: Abstinence  Return in about 4 weeks (around 3/26/2024) for EBONI.    Tom Bauer, APRN  2024

## 2024-03-21 NOTE — LACTATION NOTE
This note was copied from a baby's chart.     02/13/24 1524   Maternal Information   Date of Referral 02/13/24   Person Making Referral lactation consultant   Maternal Reason for Referral   (courtesy visit for new delivery. Hx: pt tried to BF & ended up pumping for 6mos.)   Maternal Assessment   Breast Size Issue none   Breast Shape Bilateral:;round   Breast Density Bilateral:;soft   Nipples Bilateral:;graspable   Left Nipple Symptoms intact;nontender   Right Nipple Symptoms intact;nontender   Maternal Infant Feeding   Maternal Emotional State receptive;relaxed   Infant Positioning clutch/football  (L & [employer])   Signs of Milk Transfer other (see comments)  (a few deep jaw excursions noted with stimulation. Sleepy infant. Encouraged a lot of skin to skin. Pt encouraged to pump after feeds until milk is in d/t gest. age.)   Pain with Feeding no   Latch Assistance minimal assistance   Support Person Involvement actively supporting mother   Milk Expression/Equipment   Breast Pump Type double electric, personal  ([place])        general/monitored anesthesia care ([facility])

## 2024-03-28 ENCOUNTER — POSTPARTUM VISIT (OUTPATIENT)
Dept: OBSTETRICS AND GYNECOLOGY | Facility: CLINIC | Age: 34
End: 2024-03-28
Payer: COMMERCIAL

## 2024-03-28 VITALS
WEIGHT: 195.8 LBS | BODY MASS INDEX: 31.47 KG/M2 | SYSTOLIC BLOOD PRESSURE: 122 MMHG | HEIGHT: 66 IN | DIASTOLIC BLOOD PRESSURE: 86 MMHG

## 2024-03-28 PROBLEM — Z34.90 PREGNANCY: Status: RESOLVED | Noted: 2023-07-19 | Resolved: 2024-03-28

## 2024-03-28 PROBLEM — Z82.79 FAMILY HISTORY OF CONGENITAL HEART DEFECT: Status: RESOLVED | Noted: 2023-10-12 | Resolved: 2024-03-28

## 2024-03-28 PROBLEM — O09.299 PREVIOUS GESTATIONAL DIABETES MELLITUS, ANTEPARTUM: Status: RESOLVED | Noted: 2023-07-19 | Resolved: 2024-03-28

## 2024-03-28 PROBLEM — O34.219 PREVIOUS CESAREAN DELIVERY AFFECTING PREGNANCY: Status: RESOLVED | Noted: 2024-02-09 | Resolved: 2024-03-28

## 2024-03-28 PROBLEM — O10.919 CHRONIC HYPERTENSION AFFECTING PREGNANCY: Status: RESOLVED | Noted: 2023-07-19 | Resolved: 2024-03-28

## 2024-03-28 PROBLEM — Z98.891 PREVIOUS CESAREAN SECTION: Status: RESOLVED | Noted: 2023-07-19 | Resolved: 2024-03-28

## 2024-03-28 PROBLEM — F41.9 ANXIETY DURING PREGNANCY: Status: RESOLVED | Noted: 2023-07-19 | Resolved: 2024-03-28

## 2024-03-28 PROBLEM — O99.340 ANXIETY DURING PREGNANCY: Status: RESOLVED | Noted: 2023-07-19 | Resolved: 2024-03-28

## 2024-03-28 PROBLEM — Z86.32 PREVIOUS GESTATIONAL DIABETES MELLITUS, ANTEPARTUM: Status: RESOLVED | Noted: 2023-07-19 | Resolved: 2024-03-28

## 2024-03-28 PROCEDURE — 0503F POSTPARTUM CARE VISIT: CPT | Performed by: OBSTETRICS & GYNECOLOGY

## 2024-03-28 NOTE — PROGRESS NOTES
Chief Complaint   Patient presents with    Postpartum Care       Postpartum Visit         Kalyani Sandhu is a 33 y.o.  who presents today for a 6 week(s) postpartum check.     C/S: repeat     , Low Transverse    Information for the patient's :  Jose Luis Sandhu [[id number]]   2024   male   Jose Luis Sandhu   3725 g (8 lb 3.4 oz)   Gestational Age: 37w4d        Baby Discharged: Discharged with Mom  Delivering Physician: Marianela Renteria MD    Her pregnancy was complicated by chronic HTN. She takes Labetalol 100mg twice per day, reports her BP had been normal at home so she decreased her dose from 200mg to 100mg BID. Her BP has averaged 100-120/80s. The incision is healing well. Patient describes vaginal bleeding as absent.  Patient is breastfeeding.  She desires  condoms  for contraception.      She would like to discuss the following complaints today: none.    Patient denies concerns for postpartum depression/anxiety. Patient denies  suicidal or homicidal ideation. Her postpartum depression screening questionnaire: 8. No treatment is indicated. She take Zoloft 50mg and feels well.       Last Pap : 23. Results: negative. HPV: negative.   Last Completed Pap Smear            PAP SMEAR (Every 3 Years) Next due on 2023  LIQUID-BASED PAP SMEAR WITH HPV GENOTYPING REGARDLESS OF INTERPRETATION (LAM,COR,[facility]AD)    2020  Pap IG, HPV-hr    2019  Done - negative                      The additional following portions of the patient's history were reviewed and updated as appropriate: allergies, current medications, past family history, past medical history, past social history, past surgical history, and problem list.    Review of Systems   Constitutional: Negative.    HENT: Negative.     Eyes: Negative.    Respiratory: Negative.     Cardiovascular: Negative.    Gastrointestinal: Negative.    Endocrine: Negative.    Genitourinary: Negative.   "  Musculoskeletal: Negative.    Skin:  Positive for wound.   Allergic/Immunologic: Negative.    Neurological: Negative.    Hematological: Negative.    Psychiatric/Behavioral: Negative.       All other systems reviewed and are negative.     I have reviewed and agree with the HPI, ROS, and historical information as entered above. Marianela Renteria MD      /86   Ht 167.6 cm (66\")   Wt 88.8 kg (195 lb 12.8 oz)   LMP 2023   Breastfeeding Yes   BMI 31.60 kg/m²     Physical Exam  Vitals and nursing note reviewed. Exam conducted with a chaperone present.   Constitutional:       Appearance: She is well-developed.   HENT:      Head: Normocephalic and atraumatic.   Pulmonary:      Effort: Pulmonary effort is normal.   Abdominal:      General: A surgical scar is present.      Palpations: Abdomen is soft. Abdomen is not rigid.      Comments: Clean, Dry, and Intact.  No erythema.    Genitourinary:     Vagina: No lesions or prolapsed vaginal walls.      Cervix: No lesion or erythema.      Uterus: Enlarged. Not tender and no uterine prolapse.       Adnexa:         Right: No mass, tenderness or fullness.          Left: No mass, tenderness or fullness.     Musculoskeletal:      Cervical back: Normal range of motion.   Neurological:      Mental Status: She is alert and oriented to person, place, and time.   Psychiatric:         Mood and Affect: Mood normal.         Behavior: Behavior normal.           Assessment and Plan    Problem List Items Addressed This Visit    None  Visit Diagnoses       Postpartum exam    -  Primary            S/p , 6 week(s) postpartum.  Doing well.    Return to normal physical activity.  No pelvic restrictions.   Baby doing well.  No si/sx of postpartum depression  Contraception: contraceptive methods: Condoms  Return for Annual physical.     Marianela Renteria MD  2024  "

## (undated) DEVICE — MAT PREVALON MOBL TRANSFR AIR WO/PAD 39X80IN

## (undated) DEVICE — STPLR SKIN PROXIMATE RH WD

## (undated) DEVICE — SOL IRR NACL 0.9PCT BT 1000ML

## (undated) DEVICE — SUT VIC 0/0 CT 27IN J352H BX/36

## (undated) DEVICE — SOL IRR H2O BTL 1000ML STRL

## (undated) DEVICE — SUT PLAIN  3/0 CT1 27IN 842H

## (undated) DEVICE — GLV SURG BIOGEL LTX PF 6 1/2

## (undated) DEVICE — PROXIMATE RH ROTATING HEAD SKIN STAPLERS (35 WIDE) CONTAINS 35 STAINLESS STEEL STAPLES: Brand: PROXIMATE

## (undated) DEVICE — SUT GUT CHRM 1 CTX 36IN 905H

## (undated) DEVICE — SUT VIC 2/0 CT1 27IN J339H BX/36

## (undated) DEVICE — TRY SPINE BLCK WHITACRE 25G 3X5IN

## (undated) DEVICE — PK C/SECT 10

## (undated) DEVICE — COATED VICRYL  (POLYGLACTIN 910) SUTURE, VIOLET BRAIDED, STERILE, SYNTHETIC ABSORBABLE SUTURE: Brand: COATED VICRYL

## (undated) DEVICE — ADHS SKIN PREMIERPRO EXOFIN TOPICAL HI/VISC .5ML